# Patient Record
Sex: FEMALE | Race: WHITE | Employment: PART TIME | URBAN - METROPOLITAN AREA
[De-identification: names, ages, dates, MRNs, and addresses within clinical notes are randomized per-mention and may not be internally consistent; named-entity substitution may affect disease eponyms.]

---

## 2021-11-16 ENCOUNTER — HOSPITAL ENCOUNTER (OUTPATIENT)
Dept: PREADMISSION TESTING | Age: 76
Discharge: HOME | End: 2021-11-16
Payer: MEDICARE

## 2021-11-16 ENCOUNTER — HOSPITAL ENCOUNTER (OUTPATIENT)
Dept: PREADMISSION TESTING | Age: 76
Discharge: HOME | End: 2021-11-16

## 2021-11-16 ENCOUNTER — TRANSCRIBE ORDER (OUTPATIENT)
Dept: REGISTRATION | Age: 76
End: 2021-11-16

## 2021-11-16 VITALS — WEIGHT: 150 LBS | HEIGHT: 59 IN | BODY MASS INDEX: 30.24 KG/M2

## 2021-11-16 DIAGNOSIS — M19.011 ARTHRITIS OF RIGHT SHOULDER REGION: Primary | ICD-10-CM

## 2021-11-16 LAB
ALBUMIN SERPL-MCNC: 3.6 G/DL (ref 3.4–5)
ALBUMIN/GLOB SERPL: 1 {RATIO} (ref 0.8–1.7)
ALP SERPL-CCNC: 157 U/L (ref 45–117)
ALT SERPL-CCNC: 42 U/L (ref 13–56)
ANION GAP SERPL CALC-SCNC: 5 MMOL/L (ref 3–18)
AST SERPL-CCNC: 38 U/L (ref 10–38)
BASOPHILS # BLD: 0.1 K/UL (ref 0–0.1)
BASOPHILS NFR BLD: 1 % (ref 0–2)
BILIRUB SERPL-MCNC: 0.5 MG/DL (ref 0.2–1)
BUN SERPL-MCNC: 24 MG/DL (ref 7–18)
BUN/CREAT SERPL: 19 (ref 12–20)
CALCIUM SERPL-MCNC: 9.5 MG/DL (ref 8.5–10.1)
CHLORIDE SERPL-SCNC: 107 MMOL/L (ref 100–111)
CO2 SERPL-SCNC: 29 MMOL/L (ref 21–32)
CREAT SERPL-MCNC: 1.26 MG/DL (ref 0.6–1.3)
DIFFERENTIAL METHOD BLD: ABNORMAL
EOSINOPHIL # BLD: 0.1 K/UL (ref 0–0.4)
EOSINOPHIL NFR BLD: 2 % (ref 0–5)
ERYTHROCYTE [DISTWIDTH] IN BLOOD BY AUTOMATED COUNT: 13.6 % (ref 11.6–14.5)
GLOBULIN SER CALC-MCNC: 3.6 G/DL (ref 2–4)
GLUCOSE SERPL-MCNC: 97 MG/DL (ref 74–99)
HCT VFR BLD AUTO: 37.5 % (ref 35–45)
HGB BLD-MCNC: 11.6 G/DL (ref 12–16)
IMM GRANULOCYTES # BLD AUTO: 0.1 K/UL (ref 0–0.04)
IMM GRANULOCYTES NFR BLD AUTO: 1 % (ref 0–0.5)
LYMPHOCYTES # BLD: 1.7 K/UL (ref 0.9–3.6)
LYMPHOCYTES NFR BLD: 23 % (ref 21–52)
MCH RBC QN AUTO: 28 PG (ref 24–34)
MCHC RBC AUTO-ENTMCNC: 30.9 G/DL (ref 31–37)
MCV RBC AUTO: 90.6 FL (ref 78–100)
MONOCYTES # BLD: 0.6 K/UL (ref 0.05–1.2)
MONOCYTES NFR BLD: 8 % (ref 3–10)
NEUTS SEG # BLD: 4.9 K/UL (ref 1.8–8)
NEUTS SEG NFR BLD: 66 % (ref 40–73)
NRBC # BLD: 0 K/UL (ref 0–0.01)
NRBC BLD-RTO: 0 PER 100 WBC
PLATELET # BLD AUTO: 333 K/UL (ref 135–420)
PMV BLD AUTO: 9 FL (ref 9.2–11.8)
POTASSIUM SERPL-SCNC: 4.4 MMOL/L (ref 3.5–5.5)
PROT SERPL-MCNC: 7.2 G/DL (ref 6.4–8.2)
RBC # BLD AUTO: 4.14 M/UL (ref 4.2–5.3)
SODIUM SERPL-SCNC: 141 MMOL/L (ref 136–145)
WBC # BLD AUTO: 7.3 K/UL (ref 4.6–13.2)

## 2021-11-16 PROCEDURE — 80053 COMPREHEN METABOLIC PANEL: CPT

## 2021-11-16 PROCEDURE — 87086 URINE CULTURE/COLONY COUNT: CPT

## 2021-11-16 PROCEDURE — 85025 COMPLETE CBC W/AUTO DIFF WBC: CPT

## 2021-11-16 PROCEDURE — 36415 COLL VENOUS BLD VENIPUNCTURE: CPT

## 2021-11-16 RX ORDER — TRANEXAMIC ACID 650 1/1
1950 TABLET ORAL 3 TIMES DAILY
Status: CANCELLED | OUTPATIENT
Start: 2021-11-16

## 2021-11-16 RX ORDER — SODIUM CHLORIDE, SODIUM LACTATE, POTASSIUM CHLORIDE, CALCIUM CHLORIDE 600; 310; 30; 20 MG/100ML; MG/100ML; MG/100ML; MG/100ML
125 INJECTION, SOLUTION INTRAVENOUS CONTINUOUS
Status: CANCELLED | OUTPATIENT
Start: 2021-11-16

## 2021-11-16 RX ORDER — MELOXICAM 7.5 MG/1
TABLET ORAL DAILY
COMMUNITY
End: 2022-04-06

## 2021-11-16 RX ORDER — BISMUTH SUBSALICYLATE 262 MG
1 TABLET,CHEWABLE ORAL DAILY
COMMUNITY

## 2021-11-16 RX ORDER — CEFAZOLIN SODIUM/WATER 2 G/20 ML
2 SYRINGE (ML) INTRAVENOUS ONCE
Status: CANCELLED | OUTPATIENT
Start: 2021-11-16 | End: 2021-11-16

## 2021-11-16 RX ORDER — ACETAMINOPHEN 500 MG
500 TABLET ORAL
COMMUNITY
End: 2022-04-06

## 2021-11-16 RX ORDER — LANOLIN ALCOHOL/MO/W.PET/CERES
1 CREAM (GRAM) TOPICAL
COMMUNITY

## 2021-11-16 RX ORDER — LISINOPRIL 5 MG/1
TABLET ORAL DAILY
COMMUNITY

## 2021-11-16 NOTE — PERIOP NOTES
PAT phone interview completed. Patient made aware to be NPO and to quarantine. Patient stated she will come 11/19 at 9-11 to get COVID test. Patient stated she completed EKG 11/5 and will fax EKG on 11/17. Patient made aware not to wear jewelry, makeup, nail polish, lotion, oil or spray on DOS. Patient stated she has a ride for discharge and someone to stay with her for 24 hours after procedure. Patient stated she does not have a DNR.

## 2021-11-17 LAB
BACTERIA SPEC CULT: NORMAL
SERVICE CMNT-IMP: NORMAL

## 2021-11-19 ENCOUNTER — HOSPITAL ENCOUNTER (OUTPATIENT)
Dept: PREADMISSION TESTING | Age: 76
Discharge: HOME | End: 2021-11-19
Payer: MEDICARE

## 2021-11-19 LAB
ATRIAL RATE: 74 BPM
CALCULATED P AXIS, ECG09: 78 DEGREES
CALCULATED R AXIS, ECG10: 84 DEGREES
CALCULATED T AXIS, ECG11: 64 DEGREES
DIAGNOSIS, 93000: NORMAL
P-R INTERVAL, ECG05: 144 MS
Q-T INTERVAL, ECG07: 416 MS
QRS DURATION, ECG06: 124 MS
QTC CALCULATION (BEZET), ECG08: 461 MS
VENTRICULAR RATE, ECG03: 74 BPM

## 2021-11-19 PROCEDURE — 93005 ELECTROCARDIOGRAM TRACING: CPT

## 2021-11-21 LAB
BACTERIA SPEC CULT: NORMAL
BACTERIA SPEC CULT: NORMAL
SERVICE CMNT-IMP: NORMAL

## 2021-11-21 NOTE — H&P
Patient Name:   Sienna Boyd  Account #:  [de-identified]  YOB: 1945    Chief Complaint:  Right shoulder pain. History of Chief Complaint:  She is now 64. She complains of pain in her right shoulder. She has been seeing Dr. Mirian Peterson, who had her get an intra-articular cortisone injection which led to only mild improvement. She also recently had a subacromial space injection through [employer], [facility],  which led to minimal improvement. Past Medical/Surgical History:    Disease/Disorder Date Side Surgery Date Side Comment   Arthritis         Cancer, skin         COVID-19            Arthroscopy knee 12/10/2012 left       Arthroscopy knee 06/11/2012 right       Cataract extraction 2013 bilateral    Colon cancer   Colectomy, partial 02/2017  [facility] 10/15/2018 - chemotherapy      Eyelid surgery 1998        Knee replacement 04/29/2013 right       Nose surgery 2010        Spinal fusion, cervical 02/19/2008  [facility] 10/15/2018 - C5-6, 6-7      Spinal fusion, lumbar 06/17/2008  [facility] 10/15/2018 - L3-S1   Allergies:    Ingredient Reaction Medication Name Comment   SULFUR      CODEINE        Current Medications:    Medication Directions   CALCIUM    Dilaudid 2 mg tablet take 1 tablet by oral route every 8 hours as needed for nausea   Imodium A-D    meloxicam 7.5 mg tablet take 1 tablet by oral route  every day   multivitamin tablet    turmeric 400 mg capsule    Tylenol Arthritis Pain 650 mg tablet,extended release take 2 tablet by oral route  every 8 hours as needed swallowing whole with water. Do not break, crush, dissolve and/or chew. Vitamin B-12    Zofran 4 mg tablet take 1 tablet by oral route every 8 hours as needed for nausea   Social History:    SMOKING  Status Tobacco Type Units Per Day Yrs Used   Former smoker      ALCOHOL  There is a history of alcohol use. Type: Vodka. 2 drinks consumed weekly.     Family History:    Disease Detail Family Member Age Cause of Death Comments   Cancer, lung Mother N    Arthritis Mother  N    Review of Systems:    GENERAL:  Patient has no signs of chills. , fever or weight change  HEAD/ENTM:  Patient has no signs of headaches, dizziness, hearing loss, ringing in ears, sore throat/hoarseness, recent cold, double vision, blurred vision, itchy eyes, eye redness or eye discharge. CARDIOVASCULAR:  Patient has no signs of chest pain, palpitations, rheumatic fever or heart murmur. RESPIRATORY:  Patient has no signs of chronic cough, wheezing, difficulty breathing, pain on breathing or shortness of breath. GASTROINTESTINAL:  Patient has no signs of nausea/vomiting, difficulty swallowing, gas/bloating, indigestion, abdominal pain, diarrhea, bloody stools or hemorrhoids. GENITOURINARY:  Patient has no signs of blood in urine, painful urinating, burning sensation, bladder/kidney infection, frequent urinating or incontinence. MUSCULOSKELETAL: Patient presents with joint stiffness and joint pain. Patient has no signs of fracture/dislocation, sprain/strain, tendonitis, rheumatoid disease, gout or swelling of feet. INTEGUMENTARY:  Patient has no signs of rash/itching, psoriasis, Raynaud's phenomenon or varicose veins. EMOTIONAL:  Patient has no signs of anxiety, depression, bipolar disorder, memory loss or change in mood. Vitals:  Date BP Pulse Temp (F) Resp. (per min.) Height (Total in.) Weight (lbs.) BMI   10/27/2021     59.00 150.00 30.30   07/13/2021     59.00  30.30   04/13/2021     59.00  29.29   01/21/2020     59.00  29.29   10/16/2018     59.00  29.29   07/21/2016     59.00  28.28   06/28/2016     59.00  28.28   08/25/2015 117/74 95   59.00  26.66   02/18/2015 146/90 79   60.00  27.34   12/01/2014 154/91 86   60.00  27.34   09/23/2014 118/82 82   60.00  25.58   01/06/2014 137/97 85   60.00  26.56   Physical Examination:  She has full motion right shoulder, reaching 110 degrees of forward flexion and 80 degrees of abduction.  She has slight weakness in external rotation, but she has good strength in abduction. Radiograph Examination:  Recent AP, scapular, and axillary views of the right shoulder were obtained and reviewed. They show severe glenohumeral DJD with a bone-on-bone appearance. Impression:  Severe glenohumeral degenerative joint disease, right shoulder, with questionable rotator cuff. Plan:  She will be scheduled for a right shoulder reverse total shoulder arthroplasty. She understands the risks and benefits of the procedure, and she is ready to proceed. She was issued a sling. We will plan to use Dilaudid and Zofran postop. She will get medical clearance from her cardiologist preoperatively.

## 2021-11-23 ENCOUNTER — ANESTHESIA EVENT (OUTPATIENT)
Dept: SURGERY | Age: 76
End: 2021-11-23
Payer: MEDICARE

## 2021-11-23 ENCOUNTER — ANESTHESIA (OUTPATIENT)
Dept: SURGERY | Age: 76
End: 2021-11-23
Payer: MEDICARE

## 2021-11-23 ENCOUNTER — HOSPITAL ENCOUNTER (OUTPATIENT)
Age: 76
Discharge: HOME | End: 2021-11-23
Attending: ORTHOPAEDIC SURGERY | Admitting: ORTHOPAEDIC SURGERY
Payer: MEDICARE

## 2021-11-23 VITALS
RESPIRATION RATE: 16 BRPM | SYSTOLIC BLOOD PRESSURE: 134 MMHG | TEMPERATURE: 98.5 F | HEART RATE: 97 BPM | WEIGHT: 151.8 LBS | OXYGEN SATURATION: 93 % | DIASTOLIC BLOOD PRESSURE: 81 MMHG | BODY MASS INDEX: 30.6 KG/M2 | HEIGHT: 59 IN

## 2021-11-23 PROBLEM — M19.011 DJD OF RIGHT SHOULDER: Chronic | Status: ACTIVE | Noted: 2021-11-23

## 2021-11-23 PROBLEM — Z98.890 POST-OPERATIVE STATE: Status: ACTIVE | Noted: 2021-11-23

## 2021-11-23 LAB
ABO + RH BLD: NORMAL
BLOOD GROUP ANTIBODIES SERPL: NORMAL
SPECIMEN EXP DATE BLD: NORMAL

## 2021-11-23 PROCEDURE — 74011000250 HC RX REV CODE- 250: Performed by: NURSE ANESTHETIST, CERTIFIED REGISTERED

## 2021-11-23 PROCEDURE — 97161 PT EVAL LOW COMPLEX 20 MIN: CPT

## 2021-11-23 PROCEDURE — 77030018846 HC SOL IRR STRL H20 ICUM -A: Performed by: ORTHOPAEDIC SURGERY

## 2021-11-23 PROCEDURE — 2709999900 HC NON-CHARGEABLE SUPPLY: Performed by: ORTHOPAEDIC SURGERY

## 2021-11-23 PROCEDURE — 74011250636 HC RX REV CODE- 250/636: Performed by: NURSE ANESTHETIST, CERTIFIED REGISTERED

## 2021-11-23 PROCEDURE — 77030013708 HC HNDPC SUC IRR PULS STRY –B: Performed by: ORTHOPAEDIC SURGERY

## 2021-11-23 PROCEDURE — 74011250636 HC RX REV CODE- 250/636: Performed by: ORTHOPAEDIC SURGERY

## 2021-11-23 PROCEDURE — 76010000153 HC OR TIME 1.5 TO 2 HR: Performed by: ORTHOPAEDIC SURGERY

## 2021-11-23 PROCEDURE — 76210000006 HC OR PH I REC 0.5 TO 1 HR: Performed by: ORTHOPAEDIC SURGERY

## 2021-11-23 PROCEDURE — 36415 COLL VENOUS BLD VENIPUNCTURE: CPT

## 2021-11-23 PROCEDURE — 77030008683 HC TU ET CUF COVD -A: Performed by: ANESTHESIOLOGY

## 2021-11-23 PROCEDURE — 74011000250 HC RX REV CODE- 250: Performed by: ANESTHESIOLOGY

## 2021-11-23 PROCEDURE — 74011250637 HC RX REV CODE- 250/637: Performed by: ORTHOPAEDIC SURGERY

## 2021-11-23 PROCEDURE — 74011000250 HC RX REV CODE- 250: Performed by: ORTHOPAEDIC SURGERY

## 2021-11-23 PROCEDURE — 86901 BLOOD TYPING SEROLOGIC RH(D): CPT

## 2021-11-23 PROCEDURE — 76942 ECHO GUIDE FOR BIOPSY: CPT | Performed by: ORTHOPAEDIC SURGERY

## 2021-11-23 PROCEDURE — 77030033138 HC SUT PGA STRATFX J&J -B: Performed by: ORTHOPAEDIC SURGERY

## 2021-11-23 PROCEDURE — C1776 JOINT DEVICE (IMPLANTABLE): HCPCS | Performed by: ORTHOPAEDIC SURGERY

## 2021-11-23 PROCEDURE — 97116 GAIT TRAINING THERAPY: CPT

## 2021-11-23 PROCEDURE — 74011250636 HC RX REV CODE- 250/636: Performed by: ANESTHESIOLOGY

## 2021-11-23 PROCEDURE — 77030027671 HC WRE K EXAC -B: Performed by: ORTHOPAEDIC SURGERY

## 2021-11-23 PROCEDURE — 77030031139 HC SUT VCRL2 J&J -A: Performed by: ORTHOPAEDIC SURGERY

## 2021-11-23 PROCEDURE — 97535 SELF CARE MNGMENT TRAINING: CPT

## 2021-11-23 PROCEDURE — 77030027138 HC INCENT SPIROMETER -A: Performed by: ORTHOPAEDIC SURGERY

## 2021-11-23 PROCEDURE — 77030020782 HC GWN BAIR PAWS FLX 3M -B: Performed by: ORTHOPAEDIC SURGERY

## 2021-11-23 PROCEDURE — 77030026044 HC TIP IRR PULS STRY -A: Performed by: ORTHOPAEDIC SURGERY

## 2021-11-23 PROCEDURE — 77030002922 HC SUT FBRWRE ARTH -B: Performed by: ORTHOPAEDIC SURGERY

## 2021-11-23 PROCEDURE — 74011250637 HC RX REV CODE- 250/637: Performed by: ANESTHESIOLOGY

## 2021-11-23 PROCEDURE — 77030040361 HC SLV COMPR DVT MDII -B: Performed by: ORTHOPAEDIC SURGERY

## 2021-11-23 PROCEDURE — 76060000034 HC ANESTHESIA 1.5 TO 2 HR: Performed by: ORTHOPAEDIC SURGERY

## 2021-11-23 PROCEDURE — 77030008477 HC STYL SATN SLP COVD -A: Performed by: ANESTHESIOLOGY

## 2021-11-23 PROCEDURE — 77030006807 HC BLD SAW RECIP KMET -B: Performed by: ORTHOPAEDIC SURGERY

## 2021-11-23 PROCEDURE — 64450 NJX AA&/STRD OTHER PN/BRANCH: CPT

## 2021-11-23 PROCEDURE — 77030006643: Performed by: ANESTHESIOLOGY

## 2021-11-23 PROCEDURE — 77030003806 HC BIT DRL EXAC -E: Performed by: ORTHOPAEDIC SURGERY

## 2021-11-23 PROCEDURE — 97166 OT EVAL MOD COMPLEX 45 MIN: CPT

## 2021-11-23 DEVICE — KIT BONE SCR L18MM DIA4.5MM GLEN SHLDR WHT COMPR LCK CAP RVS: Type: IMPLANTABLE DEVICE | Site: SHOULDER | Status: FUNCTIONAL

## 2021-11-23 DEVICE — SCR BNE LCK GLENOSPHERE -- EQUINOXE: Type: IMPLANTABLE DEVICE | Site: SHOULDER | Status: FUNCTIONAL

## 2021-11-23 DEVICE — SCR LCK CAP KIT 4.5X30MM BLU -- EQUINOXE: Type: IMPLANTABLE DEVICE | Site: SHOULDER | Status: FUNCTIONAL

## 2021-11-23 DEVICE — SCR TORQUE DEFINING KIT -- EQUINOXE: Type: IMPLANTABLE DEVICE | Site: SHOULDER | Status: FUNCTIONAL

## 2021-11-23 DEVICE — SHOULDER S3 TOT ADV REVRS -- IMPL CAPPED S3: Type: IMPLANTABLE DEVICE | Site: SHOULDER | Status: FUNCTIONAL

## 2021-11-23 DEVICE — KIT BNE SCR L22MM DIA4.5MM GLEN SHLDR BLK COMPR LOK CAP REV: Type: IMPLANTABLE DEVICE | Site: SHOULDER | Status: FUNCTIONAL

## 2021-11-23 DEVICE — TRAY HUM ADPT REV +0 -- EQUINOXE: Type: IMPLANTABLE DEVICE | Site: SHOULDER | Status: FUNCTIONAL

## 2021-11-23 DEVICE — IMPLANTABLE DEVICE: Type: IMPLANTABLE DEVICE | Site: SHOULDER | Status: FUNCTIONAL

## 2021-11-23 DEVICE — STEM HUM DIA10MM SHLDR PRI PRESSFIT EQUINOXE: Type: IMPLANTABLE DEVICE | Site: SHOULDER | Status: FUNCTIONAL

## 2021-11-23 DEVICE — IMPLANTABLE DEVICE
Type: IMPLANTABLE DEVICE | Site: SHOULDER | Status: FUNCTIONAL
Brand: EQUINOXE

## 2021-11-23 RX ORDER — SODIUM CHLORIDE 0.9 % (FLUSH) 0.9 %
5-40 SYRINGE (ML) INJECTION EVERY 8 HOURS
Status: DISCONTINUED | OUTPATIENT
Start: 2021-11-23 | End: 2021-11-23 | Stop reason: HOSPADM

## 2021-11-23 RX ORDER — ONDANSETRON 2 MG/ML
4 INJECTION INTRAMUSCULAR; INTRAVENOUS
Status: DISCONTINUED | OUTPATIENT
Start: 2021-11-23 | End: 2021-11-23 | Stop reason: HOSPADM

## 2021-11-23 RX ORDER — ROCURONIUM BROMIDE 10 MG/ML
INJECTION, SOLUTION INTRAVENOUS AS NEEDED
Status: DISCONTINUED | OUTPATIENT
Start: 2021-11-23 | End: 2021-11-23 | Stop reason: HOSPADM

## 2021-11-23 RX ORDER — CEFAZOLIN SODIUM/WATER 2 G/20 ML
2 SYRINGE (ML) INTRAVENOUS EVERY 8 HOURS
Status: DISCONTINUED | OUTPATIENT
Start: 2021-11-23 | End: 2021-11-23 | Stop reason: HOSPADM

## 2021-11-23 RX ORDER — DIPHENHYDRAMINE HYDROCHLORIDE 50 MG/ML
12.5 INJECTION, SOLUTION INTRAMUSCULAR; INTRAVENOUS
Status: DISCONTINUED | OUTPATIENT
Start: 2021-11-23 | End: 2021-11-23 | Stop reason: HOSPADM

## 2021-11-23 RX ORDER — ONDANSETRON 2 MG/ML
INJECTION INTRAMUSCULAR; INTRAVENOUS AS NEEDED
Status: DISCONTINUED | OUTPATIENT
Start: 2021-11-23 | End: 2021-11-23 | Stop reason: HOSPADM

## 2021-11-23 RX ORDER — SODIUM CHLORIDE, SODIUM LACTATE, POTASSIUM CHLORIDE, CALCIUM CHLORIDE 600; 310; 30; 20 MG/100ML; MG/100ML; MG/100ML; MG/100ML
125 INJECTION, SOLUTION INTRAVENOUS CONTINUOUS
Status: DISCONTINUED | OUTPATIENT
Start: 2021-11-23 | End: 2021-11-23 | Stop reason: HOSPADM

## 2021-11-23 RX ORDER — SODIUM CHLORIDE 0.9 % (FLUSH) 0.9 %
5-40 SYRINGE (ML) INJECTION AS NEEDED
Status: DISCONTINUED | OUTPATIENT
Start: 2021-11-23 | End: 2021-11-23 | Stop reason: HOSPADM

## 2021-11-23 RX ORDER — PHENYLEPHRINE HCL IN 0.9% NACL 1 MG/10 ML
SYRINGE (ML) INTRAVENOUS AS NEEDED
Status: DISCONTINUED | OUTPATIENT
Start: 2021-11-23 | End: 2021-11-23 | Stop reason: HOSPADM

## 2021-11-23 RX ORDER — TRANEXAMIC ACID 650 1/1
1950 TABLET ORAL 3 TIMES DAILY
Status: DISCONTINUED | OUTPATIENT
Start: 2021-11-23 | End: 2021-11-23

## 2021-11-23 RX ORDER — PROPOFOL 10 MG/ML
INJECTION, EMULSION INTRAVENOUS AS NEEDED
Status: DISCONTINUED | OUTPATIENT
Start: 2021-11-23 | End: 2021-11-23 | Stop reason: HOSPADM

## 2021-11-23 RX ORDER — NEOSTIGMINE METHYLSULFATE 1 MG/ML
INJECTION, SOLUTION INTRAVENOUS AS NEEDED
Status: DISCONTINUED | OUTPATIENT
Start: 2021-11-23 | End: 2021-11-23 | Stop reason: HOSPADM

## 2021-11-23 RX ORDER — HYDROMORPHONE HYDROCHLORIDE 2 MG/ML
0.5 INJECTION, SOLUTION INTRAMUSCULAR; INTRAVENOUS; SUBCUTANEOUS
Status: DISCONTINUED | OUTPATIENT
Start: 2021-11-23 | End: 2021-11-23 | Stop reason: HOSPADM

## 2021-11-23 RX ORDER — DEXMEDETOMIDINE HYDROCHLORIDE 100 UG/ML
INJECTION, SOLUTION INTRAVENOUS
Status: COMPLETED | OUTPATIENT
Start: 2021-11-23 | End: 2021-11-23

## 2021-11-23 RX ORDER — ALBUTEROL SULFATE 0.83 MG/ML
2.5 SOLUTION RESPIRATORY (INHALATION)
Status: DISCONTINUED | OUTPATIENT
Start: 2021-11-23 | End: 2021-11-23 | Stop reason: HOSPADM

## 2021-11-23 RX ORDER — ACETAMINOPHEN 500 MG
1000 TABLET ORAL ONCE
Status: COMPLETED | OUTPATIENT
Start: 2021-11-23 | End: 2021-11-23

## 2021-11-23 RX ORDER — EPHEDRINE SULFATE/0.9% NACL/PF 50 MG/5 ML
SYRINGE (ML) INTRAVENOUS AS NEEDED
Status: DISCONTINUED | OUTPATIENT
Start: 2021-11-23 | End: 2021-11-23 | Stop reason: HOSPADM

## 2021-11-23 RX ORDER — CEFAZOLIN SODIUM/WATER 2 G/20 ML
2 SYRINGE (ML) INTRAVENOUS ONCE
Status: COMPLETED | OUTPATIENT
Start: 2021-11-23 | End: 2021-11-23

## 2021-11-23 RX ORDER — CELECOXIB 100 MG/1
200 CAPSULE ORAL ONCE
Status: COMPLETED | OUTPATIENT
Start: 2021-11-23 | End: 2021-11-23

## 2021-11-23 RX ORDER — FENTANYL CITRATE 50 UG/ML
25 INJECTION, SOLUTION INTRAMUSCULAR; INTRAVENOUS AS NEEDED
Status: DISCONTINUED | OUTPATIENT
Start: 2021-11-23 | End: 2021-11-23 | Stop reason: HOSPADM

## 2021-11-23 RX ORDER — SODIUM CHLORIDE, SODIUM LACTATE, POTASSIUM CHLORIDE, CALCIUM CHLORIDE 600; 310; 30; 20 MG/100ML; MG/100ML; MG/100ML; MG/100ML
100 INJECTION, SOLUTION INTRAVENOUS CONTINUOUS
Status: DISCONTINUED | OUTPATIENT
Start: 2021-11-23 | End: 2021-11-23 | Stop reason: HOSPADM

## 2021-11-23 RX ORDER — TRANEXAMIC ACID 650 1/1
1950 TABLET ORAL ONCE
Status: COMPLETED | OUTPATIENT
Start: 2021-11-23 | End: 2021-11-23

## 2021-11-23 RX ORDER — ROPIVACAINE HYDROCHLORIDE 5 MG/ML
INJECTION, SOLUTION EPIDURAL; INFILTRATION; PERINEURAL
Status: COMPLETED | OUTPATIENT
Start: 2021-11-23 | End: 2021-11-23

## 2021-11-23 RX ORDER — NALOXONE HYDROCHLORIDE 0.4 MG/ML
0.1 INJECTION, SOLUTION INTRAMUSCULAR; INTRAVENOUS; SUBCUTANEOUS AS NEEDED
Status: DISCONTINUED | OUTPATIENT
Start: 2021-11-23 | End: 2021-11-23 | Stop reason: HOSPADM

## 2021-11-23 RX ORDER — FENTANYL CITRATE 50 UG/ML
INJECTION, SOLUTION INTRAMUSCULAR; INTRAVENOUS AS NEEDED
Status: DISCONTINUED | OUTPATIENT
Start: 2021-11-23 | End: 2021-11-23 | Stop reason: HOSPADM

## 2021-11-23 RX ORDER — LIDOCAINE HYDROCHLORIDE 20 MG/ML
INJECTION, SOLUTION EPIDURAL; INFILTRATION; INTRACAUDAL; PERINEURAL AS NEEDED
Status: DISCONTINUED | OUTPATIENT
Start: 2021-11-23 | End: 2021-11-23 | Stop reason: HOSPADM

## 2021-11-23 RX ORDER — HYDROMORPHONE HCL IN WATER/PF 1 MG/ML
SYRINGE (ML) INJECTION
Status: DISCONTINUED | OUTPATIENT
Start: 2021-11-23 | End: 2021-11-23 | Stop reason: HOSPADM

## 2021-11-23 RX ORDER — MIDAZOLAM HYDROCHLORIDE 1 MG/ML
INJECTION, SOLUTION INTRAMUSCULAR; INTRAVENOUS AS NEEDED
Status: DISCONTINUED | OUTPATIENT
Start: 2021-11-23 | End: 2021-11-23 | Stop reason: HOSPADM

## 2021-11-23 RX ORDER — GLYCOPYRROLATE 0.2 MG/ML
INJECTION INTRAMUSCULAR; INTRAVENOUS AS NEEDED
Status: DISCONTINUED | OUTPATIENT
Start: 2021-11-23 | End: 2021-11-23 | Stop reason: HOSPADM

## 2021-11-23 RX ORDER — HYDROMORPHONE HYDROCHLORIDE 2 MG/1
2 TABLET ORAL
Status: DISCONTINUED | OUTPATIENT
Start: 2021-11-23 | End: 2021-11-23 | Stop reason: HOSPADM

## 2021-11-23 RX ORDER — DEXAMETHASONE SODIUM PHOSPHATE 10 MG/ML
INJECTION INTRAMUSCULAR; INTRAVENOUS
Status: COMPLETED | OUTPATIENT
Start: 2021-11-23 | End: 2021-11-23

## 2021-11-23 RX ORDER — DEXAMETHASONE SODIUM PHOSPHATE 4 MG/ML
INJECTION, SOLUTION INTRA-ARTICULAR; INTRALESIONAL; INTRAMUSCULAR; INTRAVENOUS; SOFT TISSUE AS NEEDED
Status: DISCONTINUED | OUTPATIENT
Start: 2021-11-23 | End: 2021-11-23 | Stop reason: HOSPADM

## 2021-11-23 RX ADMIN — FENTANYL CITRATE 50 MCG: 50 INJECTION, SOLUTION INTRAMUSCULAR; INTRAVENOUS at 07:33

## 2021-11-23 RX ADMIN — TRANEXAMIC ACID 1950 MG: 650 TABLET ORAL at 06:40

## 2021-11-23 RX ADMIN — DEXAMETHASONE SODIUM PHOSPHATE 4 MG: 4 INJECTION, SOLUTION INTRAMUSCULAR; INTRAVENOUS at 09:03

## 2021-11-23 RX ADMIN — DEXMEDETOMIDINE HYDROCHLORIDE 15 MCG: 100 INJECTION, SOLUTION INTRAVENOUS at 07:16

## 2021-11-23 RX ADMIN — CEFAZOLIN 2 G: 1 INJECTION, POWDER, FOR SOLUTION INTRAVENOUS at 07:39

## 2021-11-23 RX ADMIN — FENTANYL CITRATE 50 MCG: 50 INJECTION, SOLUTION INTRAMUSCULAR; INTRAVENOUS at 07:12

## 2021-11-23 RX ADMIN — Medication 50 MCG: at 07:57

## 2021-11-23 RX ADMIN — SODIUM CHLORIDE, SODIUM LACTATE, POTASSIUM CHLORIDE, AND CALCIUM CHLORIDE: 600; 310; 30; 20 INJECTION, SOLUTION INTRAVENOUS at 09:13

## 2021-11-23 RX ADMIN — ROPIVACAINE HYDROCHLORIDE 20 ML: 5 INJECTION, SOLUTION EPIDURAL; INFILTRATION; PERINEURAL at 07:16

## 2021-11-23 RX ADMIN — CELECOXIB 200 MG: 100 CAPSULE ORAL at 06:40

## 2021-11-23 RX ADMIN — Medication 100 MCG: at 08:36

## 2021-11-23 RX ADMIN — Medication 100 MCG: at 09:06

## 2021-11-23 RX ADMIN — MIDAZOLAM 1 MG: 1 INJECTION INTRAMUSCULAR; INTRAVENOUS at 07:12

## 2021-11-23 RX ADMIN — CEFAZOLIN SODIUM 2 G: 10 INJECTION, POWDER, FOR SOLUTION INTRAVENOUS at 15:11

## 2021-11-23 RX ADMIN — SODIUM CHLORIDE, SODIUM LACTATE, POTASSIUM CHLORIDE, AND CALCIUM CHLORIDE 125 ML/HR: 600; 310; 30; 20 INJECTION, SOLUTION INTRAVENOUS at 15:05

## 2021-11-23 RX ADMIN — ONDANSETRON HYDROCHLORIDE 4 MG: 2 INJECTION INTRAMUSCULAR; INTRAVENOUS at 09:04

## 2021-11-23 RX ADMIN — Medication 100 MCG: at 08:54

## 2021-11-23 RX ADMIN — SODIUM CHLORIDE, SODIUM LACTATE, POTASSIUM CHLORIDE, AND CALCIUM CHLORIDE 125 ML/HR: 600; 310; 30; 20 INJECTION, SOLUTION INTRAVENOUS at 06:35

## 2021-11-23 RX ADMIN — Medication 100 MCG: at 08:01

## 2021-11-23 RX ADMIN — Medication 100 MCG: at 07:49

## 2021-11-23 RX ADMIN — Medication 3 MG: at 09:22

## 2021-11-23 RX ADMIN — ROCURONIUM BROMIDE 20 MG: 10 INJECTION, SOLUTION INTRAVENOUS at 08:44

## 2021-11-23 RX ADMIN — ROCURONIUM BROMIDE 30 MG: 10 INJECTION, SOLUTION INTRAVENOUS at 07:33

## 2021-11-23 RX ADMIN — DEXAMETHASONE SODIUM PHOSPHATE 4 MG: 10 INJECTION, SOLUTION INTRAMUSCULAR; INTRAVENOUS at 07:16

## 2021-11-23 RX ADMIN — ACETAMINOPHEN 1000 MG: 500 TABLET ORAL at 06:40

## 2021-11-23 RX ADMIN — GLYCOPYRROLATE 0.4 MG: 0.2 INJECTION INTRAMUSCULAR; INTRAVENOUS at 09:22

## 2021-11-23 RX ADMIN — LIDOCAINE HYDROCHLORIDE 60 MG: 20 INJECTION, SOLUTION INTRAVENOUS at 07:33

## 2021-11-23 RX ADMIN — Medication 10 MG: at 08:06

## 2021-11-23 RX ADMIN — PROPOFOL 100 MG: 10 INJECTION, EMULSION INTRAVENOUS at 07:33

## 2021-11-23 NOTE — OP NOTES
PREOPERATIVE DIAGNOSIS: Cuff Tear Arthropathy, right shoulder. POSTOPERATIVE DIAGNOSIS: Cuff Tear Arthropathy, right shoulder. PROCEDURE: right Reverse Total Shoulder Arthroplasty [employer]    IMPLANTS:   Implant Name Type Inv.  Item Serial No.  Lot No. LRB No. Used Action   extended locking caps   [id number]   Right 1 Implanted   KIT BNE SCR L22MM DIA4.5MM KIRSTEN SHLDR BLK COMPR MOSHE CAP REV - [id number]  KIT BNE SCR L22MM DIA4.5MM KIRSTEN SHLDR BLK COMPR MOSHE CAP REV [id number] [employer]WD  Right 1 Implanted   SCR LCK CAP KIT 4.5X30MM TEODORA -- [employer] - [id number]  SCR LCK CAP KIT 4.5X30MM TEODORA -- EQUINOXE [id number] [employer]  Right 1 Implanted   PLATE KIRSTEN STD SHLDR GLENDY REV [employer] - [id number]  PLATE KIRSTEN STD SHLDR GLENDY REV EQUINOXE [id number] [employer]WD  Right 1 Implanted   SCR TORQUE DEFINING KIT -- EQUIN[employer]E - [id number]  SCR TORQUE DEFINING KIT -- Sudhir Bel [id number] [employer]  Right 1 Implanted   LINER HUM [id number] +0MM OFFSET STD POLY FOR REV SHLDR SYS - [id number]  LINER HUM [id number] +0MM OFFSET STD POLY FOR REV SHLDR SYS [id number] [employer]  Right 1 Implanted   KIT BONE SCR L18MM DIA4.5MM KIRSTEN SHLDR WHT COMPR LCK CAP RVS - [id number]  KIT BONE SCR L18MM DIA4.5MM KIRSTEN SHLDR WHT COMPR LCK CAP RVS [id number] [employer]WD  Right 1 Implanted   SCR BNE LCK GLENOSPHERE -- EQUINOXE - [id number]  SCR BNE LCK GLENOSPHERE -- EQUINOXE [id number] [employer]  Right 1 Implanted   TRAY HUM ADPT REV +0 -- E[employer]E - [id number]  TRAY HUM ADPT REV +0 -- Sudhir Bel [id number] [employer]  Right 1 Implanted   STEM HUM [id number] SHLDR KARL PRESSFIT Dulcie Hides [id number]  STEM HUM [id number] SHLDR KARL PRESSFIT EQUINOXE [id number] [employer]_  Right 1 Implanted       SURGEON: Edil Ambrosio MD    ANESTHESIOLOGIST: Anesthesiologist: Alejandrina Ibarra MD  CRNA: Giuseppe Villanueva CRNA     ASSISTANTS: Circ-1: Mariel Naylor RN  Circ-Relief: Mary Up RN  Scrub Tech-1: Howard Gerardo  Scrub Private/Assistant: Zev Lord CNA    ANAESTHESIA: general anesthesia after scalene block. COMPLICATIONS: None. ESTIMATED BLOOD LOSS: 100 cc    SPECIMENS: None     DRAINS:  None. DESCRIPTION OF PROCEDURE: This 64 y. o.year-old female  with a history of persistent right shoulder pain and weakness, had a history of an unrepairable rotator cuff tear and DJD of the glenohumeral joint. The patient then opted to proceed with reconstructive surgery. The patient was taken to the operating room and placed in the beach-chair position, and the right shoulder was prepped and draped in a normal manner. The skin was injected with 0.5% Marcaine with epinephrine. An oblique incision was made over the anterior aspect of the shoulder. The incision was carried through the subcutaneous tissue through the deep fascia and the cephalic vein was retracted laterally. The dissection was carried around the humeral head. The biceps was dissected free, tenodesed distally with #2 FiberWire suture and the intraarticular portion was excised. The lesser tuberosity was osteotomized and the subscapularis was freed circumferentially and allowed to retract medially with stay sutures of #2 FiberWire. The humeral head was dislocated, and it was noted to have severe arthritic changes, along with a massive unrepairable tear of the rotator cuff. The head was osteotomized using a saw guide. The shaft was prepared using sequential reamers. The humeral head was retracted inferiorly and posteriorly. Attention was then turned to the glenoid, which showed severe arthritic changes. The labrum was excised circumferentially. The glenoid reamer was then used and finally the metaglene was placed in position, it was then secured using 3 screws, all of which were locking. The  glenosphere was then screwed in position. The proximal humerus was then prepared and trials were placed in position with sequential reductions. The trials were removed.  The shaft was irrigated using antiobiotic solution via pulsed lavage. The #2 FiberWire sutures were passed through the area of the lesser tuberosity, and the sutures were passed around the humeral component. Trial reductions were again performed. The final humeral  component was placed in position. The shoulder was reduced. Good motion was confirmed with no sign of instability. The wound was thoroughly irrigated with antibiotic solution. Hemostasis was assured using a Bovie. The lesser tuberosity was reattached using the #2 FiberWire sutures. The delto-pectoral interval was closed using 0 Vicryl suture. The subcutaneous tissue was closed with 2-0 vicryl and the skin with a Quill stitch. A sterile compressive dressing was applied, along with a sling. The patient left the operating room in satisfactory condition.

## 2021-11-23 NOTE — PERIOP NOTES
Pt transferred via bed to room 315 with attendant; vital signs stable; report off to [facility].

## 2021-11-23 NOTE — PERIOP NOTES
TRANSFER - OUT REPORT:    Verbal report given to [facility] RN(name) on Lillian Model  being transferred to [facility](unit) for routine post - op       Report consisted of patients Situation, Background, Assessment and   Recommendations(SBAR). Information from the following report(s) SBAR, Kardex, OR Summary, Procedure Summary, Intake/Output, MAR and Cardiac Rhythm NSR was reviewed with the receiving nurse. Lines:   Peripheral IV 61/22/49 Left Cephalic (Active)   Site Assessment Clean, dry, & intact 11/23/21 1010   Phlebitis Assessment 0 11/23/21 1010   Infiltration Assessment 0 11/23/21 1010   Dressing Status Clean, dry, & intact 11/23/21 1010   Dressing Type Transparent; Tape 11/23/21 1010   Hub Color/Line Status Green; Infusing; Patent 11/23/21 1010   Alcohol Cap Used No 11/23/21 6675        Opportunity for questions and clarification was provided.       Patient transported with:   O2 @ 2 liters  Registered Nurse  [facility]

## 2021-11-23 NOTE — DISCHARGE INSTRUCTIONS
Patient Education        Shoulder Replacement Surgery: What to Expect at Home  Your Recovery     Shoulder replacement surgery replaces the worn parts of your shoulder joint. When you leave the hospital, your arm will be in a sling. It will be helpful if there is someone to help you at home for the next few weeks or until you have more energy and can move around better. You will go home with a bandage and stitches, staples, skin glue, or tape strips. You can remove the bandage when your doctor tells you to. If you have staples, your doctor will remove them in 10 to 21 days. If you have stitches that are not the type that dissolve, your doctor will remove them in 10 to 14 days. Glue or tape strips will fall off on their own over time. You may still have some mild pain, and the area may be swollen for several months after surgery. Your doctor will give you medicine for the pain. A physical therapist will show you what exercises to do at home. You will continue the rehabilitation program (rehab) you started in the hospital. The better you do with your rehab exercises, the sooner you will get your strength and movement back. Depending on your job, you may be able to go back to work as early as 2 to 3 weeks after surgery, as long as you avoid certain arm movements, such as lifting. It takes at least 6 months to return to full activity. In the future, make sure to let all health professionals know about your artificial shoulder so they will know how to care for you. This care sheet gives you a general idea about how long it will take for you to recover. But each person recovers at a different pace. Follow the steps below to get better as quickly as possible. How can you care for yourself at home? Activity    · Rest when you feel tired. You may take a nap, but don't stay in bed all day.     · Work with your physical therapist to learn the best way to exercise.     · You will have a sling to wear at night.  And it's a good idea to also put a small stack of folded sheets or towels under your upper arm while you are in bed to keep your arm from dropping too far back.     · Your arm should stay next to your body or in front of it for several weeks, both while you are up and during sleep.     · Don't lift anything with the affected arm for 6 weeks.     · Ask your doctor when you can drive again.     · Ask your doctor when it is okay for you to have sex.     · Your doctor may advise you to give up activities that put stress on that shoulder. This includes sports such as weight lifting or tennis, unless your tennis arm was not the one affected. Diet    · By the time you leave the hospital, you will probably be eating your normal diet. If your stomach is upset, try bland, low-fat foods like plain rice, broiled chicken, toast, and yogurt. Your doctor may recommend that you take iron and vitamin supplements.     · Drink plenty of fluids (unless your doctor tells you not to).     · You may notice that your bowel movements are not regular right after your surgery. This is common. Try to avoid constipation and straining with bowel movements. You may want to take a fiber supplement every day. If you have not had a bowel movement after a couple of days, ask your doctor about taking a mild laxative. Medicines    · Your doctor will tell you if and when you can restart your medicines. You will also get instructions about taking any new medicines.     · If you take aspirin or some other blood thinner, ask your doctor if and when to start taking it again. Make sure that you understand exactly what your doctor wants you to do.     · Be safe with medicines. Take pain medicines exactly as directed. ? If the doctor gave you a prescription medicine for pain, take it as prescribed.   ? If you are not taking a prescription pain medicine, ask your doctor if you can take an over-the-counter medicine.     · If you think your pain medicine is making you sick to your stomach:  ? Take your medicine after meals (unless your doctor has told you not to). ? Ask your doctor for a different pain medicine.     · If your doctor prescribed antibiotics, take them as directed. Don't stop taking them just because you feel better. You need to take the full course of antibiotics.     · If you take a blood thinner, be sure you get instructions about how to take your medicine safely. Blood thinners can cause serious bleeding problems. Incision care    · If your doctor told you how to care for your cut (incision), follow your doctor's instructions. You will have a dressing over the cut. A dressing helps the incision heal and protects it. Your doctor will tell you how to take care of this.     · If you did not get instructions, follow this general advice:  ? If you have strips of tape on the cut the doctor made, leave the tape on for a week or until it falls off.  ? If you have stitches or staples, your doctor will tell you when to come back to have them removed. ? If you have skin glue on the cut, leave it on until it falls off. Skin glue is also called skin adhesive or liquid stitches. ? Change the bandage every day. ? Wash the area daily with warm water, and pat it dry. Don't use hydrogen peroxide or alcohol. They can slow healing. ? You may cover the area with a gauze bandage if it oozes fluid or rubs against clothing. ? You may shower 24 to 48 hours after surgery. Pat the incision dry. Don't swim or take a bath for the first 2 weeks, or until your doctor tells you it is okay. Exercise    · Shoulder rehabilitation is a series of exercises you do after your surgery. This helps you get back your shoulder's range of motion and strength. You will work with your doctor and physical therapist to plan this exercise program. To get the best results, you need to do the exercises correctly and as often and as long as your doctor tells you.    Ice    · For pain, put ice or a cold pack on the area for 10 to 20 minutes at a time. Put a thin cloth between the ice and your skin. Follow-up care is a key part of your treatment and safety. Be sure to make and go to all appointments, and call your doctor if you are having problems. It's also a good idea to know your test results and keep a list of the medicines you take. When should you call for help? Call 911 anytime you think you may need emergency care. For example, call if:    · You have severe trouble breathing.     · You have symptoms of a blood clot in your lung (called a pulmonary embolism). These may include:  ? Sudden chest pain. ? Trouble breathing. ? Coughing up blood. Call your doctor now or seek immediate medical care if:    · You have severe or increasing pain.     · You have symptoms of infection, such as:  ? Increased pain, swelling, warmth, or redness. ? Red streaks or pus. ? A fever.     · You have tingling, weakness, or numbness in your arm.     · Your arm turns cold or changes color.     · You have symptoms of a blood clot in your leg (called a deep vein thrombosis). These may include:  ? Pain in the calf, back of the knee, thigh, or groin. ? Redness and swelling in the leg or groin. Watch closely for changes in your health, and be sure to contact your doctor if:    · You do not get better as expected. Where can you learn more? Go to http://www.gray.com/  Enter V783 in the search box to learn more about \"Shoulder Replacement Surgery: What to Expect at Home. \"  Current as of: July 1, 2021               Content Version: 13.0  © 2402-6153 [employer], [employer]. Care instructions adapted under license by [employer] (which disclaims liability or warranty for this information).  If you have questions about a medical condition or this instruction, always ask your healthcare professional. [address] any warranty or liability for your use of this information.

## 2021-11-23 NOTE — PERIOP NOTES
[facility] RN notified SBAR report is ready for review at this time. Patient assigned to room 315.

## 2021-11-23 NOTE — ANESTHESIA PROCEDURE NOTES
R Interscalene Peripheral Block    Start time: 11/23/2021 7:13 AM  End time: 11/23/2021 7:16 AM  Performed by: Clement Johnston MD  Authorized by: Clement Johnston MD       Pre-procedure: Indications: at surgeon's request and post-op pain management    Preanesthetic Checklist: patient identified, risks and benefits discussed, site marked, timeout performed, anesthesia consent given and patient being monitored    Timeout Time: 07:12 EST          Block Type:   Block Type: Interscalene  Laterality:  Right  Monitoring:  Standard ASA monitoring, responsive to questions, oxygen, continuous pulse ox, heart rate and frequent vital sign checks  Injection Technique:  Single shot  Procedures: ultrasound guided and nerve stimulator    Patient position: semifowlers.   Prep: DuraPrep    Location:  Interscalene  Needle Type:  Stimuplex  Needle Gauge:  20 G  Needle Localization:  Nerve stimulator and ultrasound guidance  Medication Injected:  Ropivacaine (PF) (NAROPIN) 5 mg/mL (0.5 %) injection, 20 mL  dexmedeTOMidine (PRECEDEX) 100 mcg/mL iv solution, 15 mcg  dexamethasone (PF) (DECADRON) 10 mg/mL injection, 4 mg  Med Admin Time: 11/23/2021 7:16 AM    Assessment:  Number of attempts:  1  Injection Assessment:  Incremental injection every 5 mL, local visualized surrounding nerve on ultrasound, negative aspiration for blood, no intravascular symptoms, no paresthesia and ultrasound image on chart  Patient tolerance:  Patient tolerated the procedure well with no immediate complications

## 2021-11-23 NOTE — PROGRESS NOTES
This writer has reviewed discharge instructions with patient at this time. Patient has verbalized understanding. Patient was provided with care notes to include side effects of RX's. Arm bands removed and shredded. AVS reviewed with Adolfo Morgan.

## 2021-11-23 NOTE — PROGRESS NOTES
Transition of Care (MAI) Plan:          Pt admitted for an elective right shoulder surgical procedure. Pt is independent. Please encourage ambulation. No transition of care needs identified at this time. Anticipate pt will be medically stable for discharge within the next 24-48 hours with physician follow up. CM available to assist as needed. MAI Transportation:   How is patient being transported at discharge? Family/Friend      When? Once cleared by physician     Is transport scheduled? N/A      Follow-up appointment and transportation:   PCP/Specialist?  See AVS for Appointment         Who is transporting to the follow-up appointment? Self/Family/Friend      Is transport for follow up appointment scheduled? N/A    Communication plan (with patient/family): Who is being called? Patient or Next of Kin? Responsible party? Patient      What number(s) is to be used? See Facesheet      What service provider is calling for [facility] services? When are they calling? Readmission Risk? (Green/Low; Yellow/Moderate; Red/High):  [facility] to complete [address] including selection of the Healthcare Decision Maker Relationship (ie \"Primary\")  Care Management Interventions  PCP Verified by CM: Yes  Palliative Care Criteria Met (RRAT>21 & CHF Dx)?: No  Mode of Transport at Discharge:  Other (see comment) (friend)  Physical Therapy Consult: Yes  Occupational Therapy Consult: Yes  Support Systems: Other Family Member(s)  Confirm Follow Up Transport: Friends  Discharge Location  Discharge Placement: Home

## 2021-11-23 NOTE — PROGRESS NOTES
Problem: Mobility Impaired (Adult and Pediatric)  Goal: *Acute Goals and Plan of Care (Insert Text)  Description: In 1 day pt will be able to perform:  Supine to sit to sit CGA/S with HR for positioning. Transfers:   Sit to stand to chair CGA/S with LRAD for ADL's. Gait:  Ambulate >100ft CGA/S with LRAD, no LOB, for home/community mobility. Stair Negotiation:  Ascend/descend 6-inch box step min A/CGA with HR/HHA x 1 completions for home entry. Patient/Family Education:   Independent with HEP and demonstrate competency with don/doff sling for follow-up care and safe discharge. Note: [x]  Patient has met MD william victoria for d/c home   [x]  Recommend HH with 24 hour adult care   []  Benefit from additional acute PT session to address:      PHYSICAL THERAPY EVALUATION    Patient: García Johnson (56 y.o. female)  Date: 11/23/2021  Primary Diagnosis: Post-operative state [Z98.890]  Procedure(s) (LRB):  EXACTECH REVERSE TOTAL RIGHT SHOULDER ARTHROPLASTY GEN WITH SCALENE BLOCK PRN (Right) Day of Surgery   Precautions:   Fall (shoulder)    PLOF: Independent; R hand dominant    ASSESSMENT :  Based on the objective data described below, the patient presents with decreased mobility in regards to bed mobility, transfers, gt quality and tolerance, balance, stair negotiation and safety due to R TSA rev surgery. Decreased AROM of R shoulder, dec strength of R shoulder, dec sensation of R UE due to block effects also impacting pt functional mobility. Pt rating pain on numerical pain scale pre/post and during session 4/10. Pt ed regarding mobility safety, HEP, ice application/use, sling donning/doffing environmental safety and home safe techniques. Pt sitting on EOB upon arrival.  Pt able to perform sit<>stand w/ CGA/SBA. Safety vc required throughout session to reinforce safety. Pt able to participate in gt training using GB, R sling UE and CGA w/ antalgic gt pattern.   Pt was able to participate in stair training using step to pattern, L rail and CGA. Answered questions by pt in regards to PT and mobility. Pt left sitting EOB w/ all needs within reach. Nurse Kendra aware of session and outcomes. Recommend HHPT with responsible adult care at least 24 hours upon hospital d/c. Patient will benefit from skilled intervention to address the above impairments. Patient's rehabilitation potential is considered to be Good  Factors which may influence rehabilitation potential include:   []         None noted  []         Mental ability/status  []         Medical condition  []         Home/family situation and support systems  []         Safety awareness  [x]         Pain tolerance/management  []         Other:      PLAN :  Recommendations and Planned Interventions:   [x]           Bed Mobility Training             []    Neuromuscular Re-Education  [x]           Transfer Training                   []    Orthotic/Prosthetic Training  [x]           Gait Training                          [x]    Modalities  [x]           Therapeutic Exercises           [x]    Edema Management/Control  [x]           Therapeutic Activities            [x]    Family Training/Education  [x]           Patient Education  []           Other (comment):    Frequency/Duration: Patient will be followed by physical therapy 1-2 times per day/4-7 days per week to address goals. Discharge Recommendations: Home Health  Further Equipment Recommendations for Discharge: N/A     SUBJECTIVE:   Patient stated I am ready to go home.     OBJECTIVE DATA SUMMARY:     Past Medical History:   Diagnosis Date    Arthritis     osteo    COVID-19     History of chemotherapy      Past Surgical History:   Procedure Laterality Date    HX CATARACT REMOVAL  2013    OU    HX CERVICAL DISKECTOMY      HX COLONOSCOPY      HX KNEE ARTHROSCOPY  2012    bilateral    HX LUMBAR FUSION      HX PARTIAL COLECTOMY       Barriers to Learning/Limitations: yes;  anesthesia  Compensate with: Visual Cues, Verbal Cues, and Tactile Cues  Home Situation:  Home Situation  Home Environment: Private residence  # Steps to Enter: 4  Rails to Enter: Yes  Hand Rails : Bilateral  One/Two Story Residence: Two story, live on 1st floor  Living Alone: No  Support Systems: Child(felix), Other Family Member(s)  Patient Expects to be Discharged to[de-identified] House  Current DME Used/Available at Home: Cane, straight  Critical Behavior:  Neurologic State: Alert  Orientation Level: Oriented to person; Oriented to place; Oriented to situation  Cognition: Follows commands  Safety/Judgement: Awareness of environment  Psychosocial  Patient Behaviors: Calm; Cooperative  Purposeful Interaction: Yes  Pt Identified Daily Priority: Clinical issues (comment)  Skin Integrity: Incision (comment) (right shoulder)  Skin Integumentary  Skin Integrity: Incision (comment) (right shoulder)  Strength:    Strength: Generally decreased, functional  Tone & Sensation:   Tone: Normal  Sensation: Impaired (RUE)  Range Of Motion:  AROM: Generally decreased, functional (RUE numb from nerve block)  Posture:  Functional Mobility:  Bed Mobility:  Scooting: Supervision  Transfers:  Sit to Stand: Contact guard assistance; Stand-by assistance (vc)  Stand to Sit: Contact guard assistance; Stand-by assistance (vc)  Balance:   Sitting: Intact  Standing: Intact  Ambulation/Gait Training:  Distance (ft): 160 Feet (ft)  Assistive Device: Gait belt; Brace/Splint  Ambulation - Level of Assistance: Contact guard assistance; Stand-by assistance (vc)  Gait Abnormalities: Altered arm swing  Base of Support: Widened  Stance: Weight shift; Time  Speed/Caroline: Slow  Step Length: Left shortened; Right shortened  Interventions: Safety awareness training; Tactile cues; Verbal cues;  Visual/Demos  Stairs:  Number of Stairs Trained: 2  Stairs - Level of Assistance: Contact guard assistance (vc)  Rail Use: Left      Therapeutic Exercises:   Instructed and encouraged thumb/finger opposition, , wrist flex/ext, AA elbow flex, AA shoulder flex R UE. Pain:  Pain level pre-treatment: 0/10   Pain level post-treatment: 0/10   Pain Intervention(s) : Medication (see MAR); Rest, Ice, Repositioning  Response to intervention: Nurse notified, See doc flow    Activity Tolerance:   Fair   Please refer to the flowsheet for vital signs taken during this treatment. After treatment:   [x]         Patient left in no apparent distress sitting up EOB  []         Patient left in no apparent distress in bed  [x]         Call bell left within reach  [x]         Nursing notified  [x]         Caregiver present  []         Bed alarm activated  []         SCDs applied    COMMUNICATION/EDUCATION:   [x]         Role of Physical Therapy in the acute care setting. [x]         Fall prevention education was provided and the patient/caregiver indicated understanding. [x]         Patient/family have participated as able in goal setting and plan of care. [x]         Patient/family agree to work toward stated goals and plan of care. []         Patient understands intent and goals of therapy, but is neutral about his/her participation. []         Patient is unable to participate in goal setting/plan of care: ongoing with therapy staff.  []         Other:     Thank you for this referral.  Placido Claros, PT   Time Calculation: 24 mins      Eval Complexity: History: HIGH Complexity :3+ comorbidities / personal factors will impact the outcome/ POC Exam:MEDIUM Complexity : 3 Standardized tests and measures addressing body structure, function, activity limitation and / or participation in recreation  Presentation: LOW Complexity : Stable, uncomplicated  Clinical Decision Making:Low Complexity    Overall Complexity:LOW

## 2021-11-23 NOTE — ANESTHESIA POSTPROCEDURE EVALUATION
Procedure(s):  EXACTECH REVERSE TOTAL RIGHT SHOULDER ARTHROPLASTY GEN WITH SCALENE BLOCK PRN. general    Anesthesia Post Evaluation      Multimodal analgesia: multimodal analgesia used between 6 hours prior to anesthesia start to PACU discharge  Patient location during evaluation: PACU  Level of consciousness: awake and alert  Pain score: 0  Airway patency: patent  Anesthetic complications: no  Cardiovascular status: acceptable  Respiratory status: acceptable  Hydration status: acceptable  Post anesthesia nausea and vomiting:  none  Final Post Anesthesia Temperature Assessment:  Normothermia (36.0-37.5 degrees C)      INITIAL Post-op Vital signs:   Vitals Value Taken Time   /73 11/23/21 1000   Temp 36.5 °C (97.7 °F) 11/23/21 0932   Pulse 85 11/23/21 1002   Resp 17 11/23/21 1002   SpO2 96 % 11/23/21 1002   Vitals shown include unvalidated device data.

## 2021-11-23 NOTE — PERIOP NOTES
Reviewed PTA medication list with patient/caregiver and patient/caregiver denies any additional medications. Patient admits to having a responsible adult care for them at home for at least 24 hours after surgery. Patient encouraged to use gown warming system and informed that using said warming gown to regulate body temperature prior to a procedure has been shown to help reduce the risks of blood clots and infection. Patient's pharmacy of choice verified and documented in PTA medication section. Dual skin assessment & fall risk band verification completed with [facility].

## 2021-11-23 NOTE — PROGRESS NOTES
Problem: Self Care Deficits Care Plan (Adult)  Goal: *Acute Goals and Plan of Care (Insert Text)  Description: Occupational Therapy Goals Initiated 11/23/2021 within 7 day(s). 1. Patient will perform toilet transfer with supervision in preparation for bowel and bladder management. 2. Patient will perform bowel and bladder management with supervision for increased independence with ADLs. 3. Patient will perform UB dressing with supervision while utilizing krystal-techniques for increased independence with ADLs. 4. Patient will perform LB dressing with supervision while utilizing krystal-techniques for increased independence with ADLs. 5. Patient will perform bathing w/ supervision w/ A/E for increased independence with ADLs. 6. Patient will perform grooming standing at sink with supervision for increased independence with ADLs. Outcome: Progressing Towards Goal  OCCUPATIONAL THERAPY EVALUATION    Patient: Arlene Romano (73 y.o. female)  Date: 11/23/2021  Primary Diagnosis: Post-operative state [Z98.890]  Procedure(s) (LRB):  EXACTECH REVERSE TOTAL RIGHT SHOULDER ARTHROPLASTY GEN WITH SCALENE BLOCK PRN (Right) Day of Surgery   Precautions: Fall (shoulder)  PLOF: pt mod I for ADLs/functional mobility    ASSESSMENT AND RECOMMENDATIONS:  Based on the objective data described below, the patient presents with RUE decreased ROM and strength affecting UE ADLs. Pt found seated on toilet with RN, reporting pain 0/10 and RUE numb from nerve block. Pt required CGA to complete bladder hygiene and for clothing management. Pt ambulated back to seated EOB, educated pt on proper body mechanics for ADLs. Also educated on adaptive strategies and clothing modifications. Patient requires mod A to deven/doff sling. Pt required min A to complete upper/lower body dressing. Provided/educated on use of of reacher and long handled sponge/shoe horn for increased independence.  Provided opportunity for pt to voice questions on ADL performance when home, pt has no further concerns. Education: Reviewed home safety, body mechanics, importance of moving every hour to prevent joint stiffness, Pendulum Exercises, roll of ice for edema/pain control, one-handed techniques, proper positioning of sling, and adaptive dressing techniques with patient verbalizing understanding at this time     Patient will benefit from skilled intervention to address the above impairments. Patient's rehabilitation potential is considered to be Good  Factors which may influence rehabilitation potential include:   [x]             None noted  []             Mental ability/status  []             Medical condition  []             Home/family situation and support systems  []             Safety awareness  []             Pain tolerance/management  []             Other:      PLAN :  Recommendations and Planned Interventions:   [x]               Self Care Training                  [x]      Therapeutic Activities  [x]               Functional Mobility Training   []      Cognitive Retraining  [x]               Therapeutic Exercises           [x]      Endurance Activities  []               Balance Training                    []      Neuromuscular Re-Education  []               Visual/Perceptual Training     [x]      Home Safety Training  [x]               Patient Education                   [x]      Family Training/Education  []               Other (comment):    Frequency/Duration: Patient will be followed by Occupational Therapy 1-2 times per day/4-7 days per week to address goals. Discharge Recommendations: Home Health  Further Equipment Recommendations for Discharge: N/A     SUBJECTIVE:   Patient stated I am ready to go.     OBJECTIVE DATA SUMMARY:     Past Medical History:   Diagnosis Date    Arthritis     osteo    COVID-19     History of chemotherapy      Past Surgical History:   Procedure Laterality Date    HX CATARACT REMOVAL  2013    OU    HX CERVICAL DISKECTOMY      HX COLONOSCOPY      HX KNEE ARTHROSCOPY  2012    bilateral    HX LUMBAR FUSION      HX PARTIAL COLECTOMY       Barriers to Learning/Limitations: yes;  physical and other (post-anesthesia)  Compensate with: visual, verbal, tactile, kinesthetic cues/model    Home Situation/Prior Level of Function:   Home Situation  Home Environment: Private residence  # Steps to Enter: 4  Rails to Enter: Yes  Hand Rails : Bilateral  One/Two Story Residence: Two story, live on 1st floor  Living Alone: No  Support Systems: Child(felix), Other Family Member(s)  Patient Expects to be Discharged to[de-identified] House  Current DME Used/Available at Home: Cane, straight  []  Right hand dominant   []  Left hand dominant    Cognitive/Behavioral Status:  Neurologic State: Alert  Orientation Level: Oriented to person; Oriented to place; Oriented to situation  Cognition: Follows commands  Safety/Judgement: Awareness of environment    Skin: R shoulder incision w/ dressing/Mepilex  Edema: compression hose in place & applied ice     Coordination: BUE  Coordination: Generally decreased, functional  Fine Motor Skills-Upper: Right Impaired; Left Intact    Gross Motor Skills-Upper: Right Impaired; Left Intact    Balance:  Sitting: Intact  Standing: Intact    Strength: BUE  Strength: Generally decreased, functional    Tone & Sensation:BUE  Tone: Normal  Sensation: Impaired (RUE)    Range of Motion: BUE  AROM: Generally decreased, functional (RUE numb from nerve block)    Functional Mobility and Transfers for ADLs:    Transfers:  Sit to Stand: Contact guard assistance; Stand-by assistance (vc)    ADL Assessment:  Feeding: Setup; Modified independent  Oral Facial Hygiene/Grooming: Minimum assistance  Bathing: Minimum assistance;  Adaptive equipment  Upper Body Dressing: Minimum assistance  Lower Body Dressing: Minimum assistance  Toileting: Contact guard assistance    ADL Intervention:  Upper Body Dressing Assistance  Dressing Assistance: Minimum assistance  Orthotics(Brace): Moderate assistance  Pullover Shirt: Minimum assistance  Front Opened Shirt: Minimum assistance    Lower Body Dressing Assistance  Dressing Assistance: Minimum assistance  Underpants: Minimum assistance  Pants With Elastic Waist: Minimum assistance  Leg Crossed Method Used: No  Position Performed: Seated edge of bed  Cues: Verbal cues provided; Visual cues provided    Toileting  Toileting Assistance: Contact guard assistance  Bladder Hygiene: Stand-by assistance  Clothing Management: Minimum assistance    Cognitive Retraining  Safety/Judgement: Awareness of environment    Pain:  Pain level pre-treatment: 0/10  Pain level post-treatment: 0/10  Pain Intervention(s): Medication administer by Nursing (see MAR); Rest, Ice, Repositioning   Response to intervention: Nurse notified, see doc flow sheet    Activity Tolerance:   Fair. Patient able to stand ~5 minute(s). Patient able to complete ADLs with intermittent rest breaks. Patient limited by strength, ROM, coordination. Patient unsteady. Please refer to the flowsheet for vital signs taken during this treatment. After treatment:   []  Patient left in no apparent distress sitting up in chair  [x]  Patient sitting on EOB  []  Patient left in no apparent distress in bed  [x]  Call bell left within reach  [x]  Nursing notified  []  Caregiver present  [x]  Ice applied  []  SCD's on while back in bed  [] Bed alarm activated    COMMUNICATION/EDUCATION:   Communication/Collaboration:  [x]       Role of Occupational Therapy in the acute care setting. [x]      Home safety education was provided and the patient/caregiver indicated understanding. [x]      Patient/family have participated as able in goal setting and plan of care. [x]      Patient/family agree to work toward stated goals and plan of care. []      Patient understands intent and goals of therapy, but is neutral about his/her participation.   []      Patient is unable to participate in plan of care at this time. Thank you for this referral.  Gary Farrell OTR/L  Time Calculation: 23 mins    Eval Complexity: History: MEDIUM Complexity : Expanded review of history including physical, cognitive and psychosocial  history ; Examination: MEDIUM Complexity : 3-5 performance deficits relating to physical, cognitive , or psychosocial skils that result in activity limitations and / or participation restrictions; Decision Making:MEDIUM Complexity : Patient may present with comorbidities that affect occupational performnce.  Miniml to moderate modification of tasks or assistance (eg, physical or verbal ) with assesment(s) is necessary to enable patient to complete evaluation

## 2021-11-23 NOTE — INTERVAL H&P NOTE
Update History & Physical    The Patient's History and Physical of November 20, 2021 was reviewed with the patient and I examined the patient. There was no change. The surgical site was confirmed by the patient and me. Plan:  The risk, benefits, expected outcome, and alternative to the recommended procedure have been discussed with the patient. Patient understands and wants to proceed with the procedure.     Electronically signed by Love Carroll MD on 11/23/2021 at 7:19 AM

## 2021-11-23 NOTE — ANESTHESIA PREPROCEDURE EVALUATION
Relevant Problems   No relevant active problems       Anesthetic History   No history of anesthetic complications            Review of Systems / Medical History  Patient summary reviewed, nursing notes reviewed and pertinent labs reviewed    Pulmonary  Within defined limits            Pertinent negatives: No sleep apnea and smoker     Neuro/Psych   Within defined limits           Cardiovascular                  Exercise tolerance: >4 METS     GI/Hepatic/Renal             Pertinent negatives: No hiatal hernia and GERD   Endo/Other        Arthritis     Other Findings              Physical Exam    Airway  Mallampati: II  TM Distance: 4 - 6 cm  Neck ROM: normal range of motion   Mouth opening: Normal     Cardiovascular    Rhythm: regular  Rate: normal         Dental  No notable dental hx       Pulmonary  Breath sounds clear to auscultation               Abdominal  GI exam deferred       Other Findings            Anesthetic Plan    ASA: 2  Anesthesia type: general      Post-op pain plan if not by surgeon: peripheral nerve block single    Induction: Intravenous  Anesthetic plan and risks discussed with: Patient

## 2022-03-19 PROBLEM — M19.011 DJD OF RIGHT SHOULDER: Status: ACTIVE | Noted: 2021-11-23

## 2022-03-19 PROBLEM — Z98.890 POST-OPERATIVE STATE: Status: ACTIVE | Noted: 2021-11-23

## 2022-03-30 ENCOUNTER — TRANSCRIBE ORDER (OUTPATIENT)
Dept: REGISTRATION | Age: 77
End: 2022-03-30

## 2022-03-30 ENCOUNTER — HOSPITAL ENCOUNTER (OUTPATIENT)
Dept: PREADMISSION TESTING | Age: 77
Discharge: HOME | End: 2022-03-30
Payer: MEDICARE

## 2022-03-30 DIAGNOSIS — M47.22 CERVICAL SPONDYLOSIS WITH RADICULOPATHY: ICD-10-CM

## 2022-03-30 DIAGNOSIS — M47.22 CERVICAL SPONDYLOSIS WITH RADICULOPATHY: Primary | ICD-10-CM

## 2022-03-30 LAB
ALBUMIN SERPL-MCNC: 3.5 G/DL (ref 3.4–5)
ALBUMIN/GLOB SERPL: 1 {RATIO} (ref 0.8–1.7)
ALP SERPL-CCNC: 166 U/L (ref 45–117)
ALT SERPL-CCNC: 47 U/L (ref 13–56)
ANION GAP SERPL CALC-SCNC: 4 MMOL/L (ref 3–18)
APTT PPP: 29 SEC (ref 23–36.4)
AST SERPL-CCNC: 38 U/L (ref 10–38)
ATRIAL RATE: 75 BPM
BILIRUB SERPL-MCNC: 0.3 MG/DL (ref 0.2–1)
BUN SERPL-MCNC: 24 MG/DL (ref 7–18)
BUN/CREAT SERPL: 25 (ref 12–20)
CALCIUM SERPL-MCNC: 9 MG/DL (ref 8.5–10.1)
CALCULATED P AXIS, ECG09: 79 DEGREES
CALCULATED R AXIS, ECG10: 83 DEGREES
CALCULATED T AXIS, ECG11: 93 DEGREES
CHLORIDE SERPL-SCNC: 108 MMOL/L (ref 100–111)
CO2 SERPL-SCNC: 29 MMOL/L (ref 21–32)
CREAT SERPL-MCNC: 0.97 MG/DL (ref 0.6–1.3)
DIAGNOSIS, 93000: NORMAL
ERYTHROCYTE [DISTWIDTH] IN BLOOD BY AUTOMATED COUNT: 13.4 % (ref 11.6–14.5)
GLOBULIN SER CALC-MCNC: 3.4 G/DL (ref 2–4)
GLUCOSE SERPL-MCNC: 122 MG/DL (ref 74–99)
HCT VFR BLD AUTO: 34.2 % (ref 35–45)
HGB BLD-MCNC: 10.9 G/DL (ref 12–16)
INR PPP: 1 (ref 0.8–1.2)
MCH RBC QN AUTO: 28.2 PG (ref 24–34)
MCHC RBC AUTO-ENTMCNC: 31.9 G/DL (ref 31–37)
MCV RBC AUTO: 88.6 FL (ref 78–100)
NRBC # BLD: 0 K/UL (ref 0–0.01)
NRBC BLD-RTO: 0 PER 100 WBC
P-R INTERVAL, ECG05: 148 MS
PLATELET # BLD AUTO: 284 K/UL (ref 135–420)
PMV BLD AUTO: 8.9 FL (ref 9.2–11.8)
POTASSIUM SERPL-SCNC: 3.7 MMOL/L (ref 3.5–5.5)
PROT SERPL-MCNC: 6.9 G/DL (ref 6.4–8.2)
PROTHROMBIN TIME: 13 SEC (ref 11.5–15.2)
Q-T INTERVAL, ECG07: 430 MS
QRS DURATION, ECG06: 128 MS
QTC CALCULATION (BEZET), ECG08: 480 MS
RBC # BLD AUTO: 3.86 M/UL (ref 4.2–5.3)
SODIUM SERPL-SCNC: 141 MMOL/L (ref 136–145)
VENTRICULAR RATE, ECG03: 75 BPM
WBC # BLD AUTO: 6.5 K/UL (ref 4.6–13.2)

## 2022-03-30 PROCEDURE — 80053 COMPREHEN METABOLIC PANEL: CPT

## 2022-03-30 PROCEDURE — 36415 COLL VENOUS BLD VENIPUNCTURE: CPT

## 2022-03-30 PROCEDURE — 85730 THROMBOPLASTIN TIME PARTIAL: CPT

## 2022-03-30 PROCEDURE — 85027 COMPLETE CBC AUTOMATED: CPT

## 2022-03-30 PROCEDURE — 93005 ELECTROCARDIOGRAM TRACING: CPT

## 2022-03-30 PROCEDURE — 85610 PROTHROMBIN TIME: CPT

## 2022-03-31 LAB
BACTERIA SPEC CULT: NORMAL
BACTERIA SPEC CULT: NORMAL
SERVICE CMNT-IMP: NORMAL

## 2022-04-05 NOTE — NURSE NAVIGATOR
Patrice Flores watched the preoperative spine seminar online and received a preoperative spine education book in anticipation of surgery.      Nurse Navigator

## 2022-04-06 ENCOUNTER — HOSPITAL ENCOUNTER (OUTPATIENT)
Dept: PREADMISSION TESTING | Age: 77
Discharge: HOME | End: 2022-04-06

## 2022-04-06 VITALS — WEIGHT: 150 LBS | HEIGHT: 59 IN | BODY MASS INDEX: 30.24 KG/M2

## 2022-04-06 RX ORDER — CEFAZOLIN SODIUM/WATER 2 G/20 ML
2 SYRINGE (ML) INTRAVENOUS ONCE
Status: CANCELLED | OUTPATIENT
Start: 2022-04-06 | End: 2022-04-06

## 2022-04-06 RX ORDER — SODIUM CHLORIDE, SODIUM LACTATE, POTASSIUM CHLORIDE, CALCIUM CHLORIDE 600; 310; 30; 20 MG/100ML; MG/100ML; MG/100ML; MG/100ML
125 INJECTION, SOLUTION INTRAVENOUS CONTINUOUS
Status: CANCELLED | OUTPATIENT
Start: 2022-04-06

## 2022-04-06 NOTE — PERIOP NOTES
PAT phone interview completed. Patient made aware to be NPO. Patient stated she had two doses of COVID vaccine. Patient made aware not to get COVID test. Patient stated she has a ride for discharge and someone to stay with her for 24 hours after procedure. Patient made aware not to wear jewelry, lotion, oil or spray on DOS. Reviewed surgical skin preparation with patient. Patient stated understanding. Opportunity for questions given. Patient stated she does not have a DNR order.

## 2022-04-14 PROBLEM — M54.10 RADICULOPATHY OF ARM: Status: ACTIVE | Noted: 2022-04-14

## 2022-04-14 PROBLEM — M48.02 CERVICAL SPINAL STENOSIS: Status: ACTIVE | Noted: 2022-04-14

## 2022-04-14 PROBLEM — M47.812 CERVICAL SPONDYLOSIS: Status: ACTIVE | Noted: 2022-04-14

## 2022-04-14 NOTE — H&P
History and Physical        Patient: Madison Barker               Sex: female          DOA: (Not on file)         YOB: 1945      Age:  64 y.o.        LOS:  LOS: 0 days        HPI:     Madison Barker is a 64 y.o. female who complains of neck and upper extremity pain. She has pain in the  bilaterally cervical paraspinals and then travels into the bilaterally upper extremity. She has numbness/tingling in the same distribution of their pain. She denies weakness in the bilateral upper extremity. She denies any fevers, chills or night sweats. At this time the patient has failed conservative care including anti-inflammatories, analgesics, physical therapy and injections. Shewould like to move forward with surgical intervention since their pain affects their activities of daily living. Past Medical History:   Diagnosis Date    Arthritis     osteo    Colon cancer ([facility] [address]     possibly, was suggested to do chemotherapy in case    COVID-19     History of chemotherapy        Past Surgical History:   Procedure Laterality Date    HX CATARACT REMOVAL      OU    HX CERVICAL DISKECTOMY      HX COLONOSCOPY      HX KNEE ARTHROSCOPY  2012    bilateral    HX KNEE REPLACEMENT      HX KNEE REPLACEMENT      HX LUMBAR FUSION      HX PARTIAL COLECTOMY      HX SHOULDER REPLACEMENT Right        Family History   Problem Relation Age of Onset    Malignant Hyperthermia Neg Hx     Pseudocholinesterase Deficiency Neg Hx     Delayed Awakening Neg Hx     Post-op Nausea/Vomiting Neg Hx     Emergence Delirium Neg Hx     Post-op Cognitive Dysfunction Neg Hx        Social History     Socioeconomic History    Marital status: SINGLE   Tobacco Use    Smoking status: Former Smoker     Quit date: 1970     Years since quittin.3    Smokeless tobacco: Never Used   Substance and Sexual Activity    Alcohol use:  Yes     Alcohol/week: 0.8 standard drinks     Types: 1 Glasses of wine per week     Comment: occasional    Drug use: No       Prior to Admission medications    Medication Sig Start Date End Date Taking? Authorizing Provider   multivitamin (ONE A DAY) tablet Take 1 Tablet by mouth daily. Provider, Historical   calcium citrate-vitamin d3 (CITRACAL+D) 315 mg-5 mcg (200 unit) tab Take 1 Tablet by mouth daily (with breakfast). Provider, Historical   TURMERIC PO Take  by mouth. Provider, Historical   ferrous fumarate/vit Bcomp,C (SUPER B COMPLEX PO) Take  by mouth. Provider, Historical   lisinopriL (PRINIVIL, ZESTRIL) 5 mg tablet Take  by mouth daily. Provider, Historical   loperamide (ANTI-DIARRHEAL, LOPERAMIDE,) 2 mg capsule Take 2 mg by mouth daily. Indications: DIARRHEA    Provider, Historical       Allergies   Allergen Reactions    Codeine Nausea Only       Review of Systems  A comprehensive review of systems was negative except for that written in the History of Present Illness. Physical Exam:      There were no vitals taken for this visit. Physical Exam:  General: Alert and Oriented X 3  Lungs: Clear to ausculation bilaterally  Cardiovascular: Regular Rate and Rhythm, without murmur  Abdomen: Soft, nontender with positive bowel sounds in all four quadrants  Gential/Rectal: deferred  Musculoskeletal: The patient has full range of motion in the cervical spine in flexion, extension and side rotation bilaterally. The patient has pain with flexion or extension. The patient has tenderness over the bilateral cervical paraspinals. The patient is neurologically intact in the upper extremities. Pulses are 2+. Radiographic evaluation shows cervical spondylosis C3-C5       Labs Reviewed: All lab results for the last 24 hours reviewed. Assessment/Plan     Principal Problem:    Cervical spinal stenosis (4/14/2022)    Active Problems:    Cervical spondylosis (4/14/2022)      Radiculopathy of arm (4/14/2022)        We are going to move forward with an ACDF C3-C5.   The risks vs the benefits have been discussed with the patient. The patient will follow-up in the office 10 days postoperatively and call with any and all concerns.

## 2022-04-20 ENCOUNTER — ANESTHESIA EVENT (OUTPATIENT)
Dept: SURGERY | Age: 77
End: 2022-04-20
Payer: MEDICARE

## 2022-04-20 NOTE — ANESTHESIA PREPROCEDURE EVALUATION
Relevant Problems   No relevant active problems       Anesthetic History        Pertinent negatives: No PONV       Review of Systems / Medical History  Patient summary reviewed, nursing notes reviewed and pertinent labs reviewed    Pulmonary          Smoker (former, quit 1970)    Pertinent negatives: No asthma (pt s/p covid 2 years ago, placed on maintenance asthma therapy since)     Neuro/Psych     seizures      Pertinent negatives: No CVA   Cardiovascular                Pertinent negatives: No hypertension and past MI  Exercise tolerance: >4 METS  Comments: Echo: 2020 NORMAL LEFT VENTRICULAR CAVITY SIZE.    MILDLY INCREASED LEFT VENTRICULAR WALL THICKNESS.    NORMAL GLOBAL LEFT VENTRICULAR SYSTOLIC FUNCTION.   [address] IS 60%.    NORMAL LEFT ATRIAL SIZE.    NORMAL RIGHT VENTRICULAR SIZE WITH DECREASED SYSTOLIC FUNCTION.    NORMAL RIGHT ATRIAL SIZE.    MILDLY THICKENED MV WITH NORMAL EXCURSION.    SCLEROTIC AOV WITHOUT STENOSIS.    TRACE TRICUSPID REGURGITATION.    ESTIMATED RIGHT VENTRICULAR SYSTOLIC PRESSURE IS 78RGMZ.    PV NOT INTERROGATED.   [facility].    NO MASSES, SHUNTS OR THROMBI SEEN.   :   GI/Hepatic/Renal             Pertinent negatives: No GERD, liver disease and renal disease   Endo/Other        Obesity, arthritis and cancer (h/o colon CA)     Other Findings            Physical Exam    Airway  Mallampati: II  TM Distance: < 4 cm  Neck ROM: normal range of motion   Mouth opening: Normal     Cardiovascular    Rhythm: regular  Rate: normal         Dental  No notable dental hx       Pulmonary  Breath sounds clear to auscultation               Abdominal  GI exam deferred       Other Findings            Anesthetic Plan    ASA: 2  Anesthesia type: general          Induction: Intravenous  Anesthetic plan and risks discussed with: Patient      Plan general anesthesia.   Will have patient extend neck as far as comfortable prior to induction and will not extend further during intubation. Patient understands and accepts all risks associated with general anesthesia as outlined on the anesthesia consent form. Patient given opportunity to ask questions and all questions answered. Anesthesia consent form signed by patient.

## 2022-04-21 ENCOUNTER — HOSPITAL ENCOUNTER (OUTPATIENT)
Age: 77
Setting detail: OUTPATIENT SURGERY
Discharge: HOME | End: 2022-04-21
Attending: ORTHOPAEDIC SURGERY | Admitting: ORTHOPAEDIC SURGERY
Payer: MEDICARE

## 2022-04-21 ENCOUNTER — APPOINTMENT (OUTPATIENT)
Dept: GENERAL RADIOLOGY | Age: 77
End: 2022-04-21
Attending: ORTHOPAEDIC SURGERY
Payer: MEDICARE

## 2022-04-21 ENCOUNTER — ANESTHESIA (OUTPATIENT)
Dept: SURGERY | Age: 77
End: 2022-04-21
Payer: MEDICARE

## 2022-04-21 VITALS
HEIGHT: 59 IN | DIASTOLIC BLOOD PRESSURE: 73 MMHG | WEIGHT: 151.9 LBS | TEMPERATURE: 98.2 F | BODY MASS INDEX: 30.62 KG/M2 | OXYGEN SATURATION: 95 % | RESPIRATION RATE: 12 BRPM | SYSTOLIC BLOOD PRESSURE: 149 MMHG | HEART RATE: 92 BPM

## 2022-04-21 DIAGNOSIS — M54.10 RADICULOPATHY OF ARM: Primary | ICD-10-CM

## 2022-04-21 PROCEDURE — 74011250636 HC RX REV CODE- 250/636: Performed by: NURSE ANESTHETIST, CERTIFIED REGISTERED

## 2022-04-21 PROCEDURE — 74011000250 HC RX REV CODE- 250: Performed by: NURSE ANESTHETIST, CERTIFIED REGISTERED

## 2022-04-21 PROCEDURE — C1713 ANCHOR/SCREW BN/BN,TIS/BN: HCPCS | Performed by: ORTHOPAEDIC SURGERY

## 2022-04-21 PROCEDURE — 76060000035 HC ANESTHESIA 2 TO 2.5 HR: Performed by: ORTHOPAEDIC SURGERY

## 2022-04-21 PROCEDURE — 76010000131 HC OR TIME 2 TO 2.5 HR: Performed by: ORTHOPAEDIC SURGERY

## 2022-04-21 PROCEDURE — 77030040504 HC DRN WND MDII -B: Performed by: ORTHOPAEDIC SURGERY

## 2022-04-21 PROCEDURE — 74011000250 HC RX REV CODE- 250: Performed by: ORTHOPAEDIC SURGERY

## 2022-04-21 PROCEDURE — 77030008683 HC TU ET CUF COVD -A: Performed by: NURSE ANESTHETIST, CERTIFIED REGISTERED

## 2022-04-21 PROCEDURE — 74011250636 HC RX REV CODE- 250/636: Performed by: ORTHOPAEDIC SURGERY

## 2022-04-21 PROCEDURE — 77030040506 HC DRN WND MDII -A: Performed by: ORTHOPAEDIC SURGERY

## 2022-04-21 PROCEDURE — 77030034475 HC MISC IMPL SPN: Performed by: ORTHOPAEDIC SURGERY

## 2022-04-21 PROCEDURE — 77030040361 HC SLV COMPR DVT MDII -B: Performed by: ORTHOPAEDIC SURGERY

## 2022-04-21 PROCEDURE — 77030033138 HC SUT PGA STRATFX J&J -B: Performed by: ORTHOPAEDIC SURGERY

## 2022-04-21 PROCEDURE — 76210000021 HC REC RM PH II 0.5 TO 1 HR: Performed by: ORTHOPAEDIC SURGERY

## 2022-04-21 PROCEDURE — 77030018390 HC SPNG HEMSTAT2 J&J -B: Performed by: ORTHOPAEDIC SURGERY

## 2022-04-21 PROCEDURE — 76210000006 HC OR PH I REC 0.5 TO 1 HR: Performed by: ORTHOPAEDIC SURGERY

## 2022-04-21 PROCEDURE — 77030010507 HC ADH SKN DERMBND J&J -B: Performed by: ORTHOPAEDIC SURGERY

## 2022-04-21 PROCEDURE — 74011000272 HC RX REV CODE- 272: Performed by: ORTHOPAEDIC SURGERY

## 2022-04-21 PROCEDURE — 74011250637 HC RX REV CODE- 250/637: Performed by: ANESTHESIOLOGY

## 2022-04-21 PROCEDURE — 77030020782 HC GWN BAIR PAWS FLX 3M -B: Performed by: ORTHOPAEDIC SURGERY

## 2022-04-21 PROCEDURE — 77030006643: Performed by: NURSE ANESTHETIST, CERTIFIED REGISTERED

## 2022-04-21 PROCEDURE — 77030008477 HC STYL SATN SLP COVD -A: Performed by: NURSE ANESTHETIST, CERTIFIED REGISTERED

## 2022-04-21 PROCEDURE — 2709999900 HC NON-CHARGEABLE SUPPLY: Performed by: ORTHOPAEDIC SURGERY

## 2022-04-21 PROCEDURE — 77030003028 HC SUT VCRL J&J -A: Performed by: ORTHOPAEDIC SURGERY

## 2022-04-21 PROCEDURE — 77030027138 HC INCENT SPIROMETER -A: Performed by: ORTHOPAEDIC SURGERY

## 2022-04-21 DEVICE — BONE SCREW, LOCKING, 3.5MM X 14MM
Type: IMPLANTABLE DEVICE | Site: SPINE CERVICAL | Status: FUNCTIONAL
Brand: ENDOSKELETON® TCS

## 2022-04-21 DEVICE — SCREW 3125313 4.5 X 13 SELF TAP VAR
Type: IMPLANTABLE DEVICE | Site: SPINE CERVICAL | Status: FUNCTIONAL
Brand: ATLANTIS® ANTERIOR CERVICAL PLATE SYSTEM

## 2022-04-21 DEVICE — BONE SCREW, LOCKING, 3.8MM X 14MM
Type: IMPLANTABLE DEVICE | Site: SPINE CERVICAL | Status: FUNCTIONAL
Brand: ENDOSKELETON® TCS

## 2022-04-21 DEVICE — INTERBODY FUSION DEVICE  6 DEGREE MEDIUM 6MM
Type: IMPLANTABLE DEVICE | Site: SPINE CERVICAL | Status: FUNCTIONAL
Brand: ENDOSKELETON® TC NANOLOCK® SURFACE TECHNOLOGY

## 2022-04-21 DEVICE — INTERBODY FUSION DEVICE  6 DEGREE MEDIUM 7MM
Type: IMPLANTABLE DEVICE | Site: SPINE CERVICAL | Status: FUNCTIONAL
Brand: ENDOSKELETON® TCS NANOLOCK® SURFACE TECHNOLOGY

## 2022-04-21 RX ORDER — SODIUM CHLORIDE, SODIUM LACTATE, POTASSIUM CHLORIDE, CALCIUM CHLORIDE 600; 310; 30; 20 MG/100ML; MG/100ML; MG/100ML; MG/100ML
125 INJECTION, SOLUTION INTRAVENOUS CONTINUOUS
Status: DISCONTINUED | OUTPATIENT
Start: 2022-04-21 | End: 2022-04-21 | Stop reason: HOSPADM

## 2022-04-21 RX ORDER — CEPHALEXIN 500 MG/1
500 CAPSULE ORAL 4 TIMES DAILY
Qty: 28 CAPSULE | Refills: 0 | Status: SHIPPED | OUTPATIENT
Start: 2022-04-21

## 2022-04-21 RX ORDER — ACETAMINOPHEN 500 MG
1000 TABLET ORAL ONCE
Status: COMPLETED | OUTPATIENT
Start: 2022-04-21 | End: 2022-04-21

## 2022-04-21 RX ORDER — HYDROMORPHONE HYDROCHLORIDE 1 MG/ML
0.5 INJECTION, SOLUTION INTRAMUSCULAR; INTRAVENOUS; SUBCUTANEOUS
Status: DISCONTINUED | OUTPATIENT
Start: 2022-04-21 | End: 2022-04-21 | Stop reason: HOSPADM

## 2022-04-21 RX ORDER — NALOXONE HYDROCHLORIDE 0.4 MG/ML
0.4 INJECTION, SOLUTION INTRAMUSCULAR; INTRAVENOUS; SUBCUTANEOUS AS NEEDED
Status: DISCONTINUED | OUTPATIENT
Start: 2022-04-21 | End: 2022-04-21 | Stop reason: HOSPADM

## 2022-04-21 RX ORDER — LIDOCAINE HYDROCHLORIDE 20 MG/ML
INJECTION, SOLUTION EPIDURAL; INFILTRATION; INTRACAUDAL; PERINEURAL AS NEEDED
Status: DISCONTINUED | OUTPATIENT
Start: 2022-04-21 | End: 2022-04-21 | Stop reason: HOSPADM

## 2022-04-21 RX ORDER — ROCURONIUM BROMIDE 10 MG/ML
INJECTION, SOLUTION INTRAVENOUS AS NEEDED
Status: DISCONTINUED | OUTPATIENT
Start: 2022-04-21 | End: 2022-04-21 | Stop reason: HOSPADM

## 2022-04-21 RX ORDER — SODIUM CHLORIDE 0.9 % (FLUSH) 0.9 %
5-40 SYRINGE (ML) INJECTION AS NEEDED
Status: DISCONTINUED | OUTPATIENT
Start: 2022-04-21 | End: 2022-04-21 | Stop reason: HOSPADM

## 2022-04-21 RX ORDER — OXYCODONE HYDROCHLORIDE 5 MG/1
5 TABLET ORAL
Status: DISCONTINUED | OUTPATIENT
Start: 2022-04-21 | End: 2022-04-21 | Stop reason: HOSPADM

## 2022-04-21 RX ORDER — CEFAZOLIN SODIUM/WATER 2 G/20 ML
2 SYRINGE (ML) INTRAVENOUS ONCE
Status: COMPLETED | OUTPATIENT
Start: 2022-04-21 | End: 2022-04-21

## 2022-04-21 RX ORDER — OXYCODONE HYDROCHLORIDE 5 MG/1
5 TABLET ORAL
Qty: 28 TABLET | Refills: 0 | Status: SHIPPED | OUTPATIENT
Start: 2022-04-21 | End: 2022-04-28

## 2022-04-21 RX ORDER — FENTANYL CITRATE 50 UG/ML
50 INJECTION, SOLUTION INTRAMUSCULAR; INTRAVENOUS AS NEEDED
Status: DISCONTINUED | OUTPATIENT
Start: 2022-04-21 | End: 2022-04-21 | Stop reason: HOSPADM

## 2022-04-21 RX ORDER — NEOSTIGMINE METHYLSULFATE 1 MG/ML
INJECTION, SOLUTION INTRAVENOUS AS NEEDED
Status: DISCONTINUED | OUTPATIENT
Start: 2022-04-21 | End: 2022-04-21 | Stop reason: HOSPADM

## 2022-04-21 RX ORDER — SODIUM CHLORIDE 0.9 % (FLUSH) 0.9 %
5-40 SYRINGE (ML) INJECTION EVERY 8 HOURS
Status: DISCONTINUED | OUTPATIENT
Start: 2022-04-21 | End: 2022-04-21 | Stop reason: HOSPADM

## 2022-04-21 RX ORDER — HYDROMORPHONE HYDROCHLORIDE 2 MG/ML
INJECTION, SOLUTION INTRAMUSCULAR; INTRAVENOUS; SUBCUTANEOUS AS NEEDED
Status: DISCONTINUED | OUTPATIENT
Start: 2022-04-21 | End: 2022-04-21 | Stop reason: HOSPADM

## 2022-04-21 RX ORDER — DEXTROMETHORPHAN HYDROBROMIDE, GUAIFENESIN 5; 100 MG/5ML; MG/5ML
650 LIQUID ORAL DAILY
COMMUNITY

## 2022-04-21 RX ORDER — GLYCOPYRROLATE 0.2 MG/ML
INJECTION INTRAMUSCULAR; INTRAVENOUS AS NEEDED
Status: DISCONTINUED | OUTPATIENT
Start: 2022-04-21 | End: 2022-04-21 | Stop reason: HOSPADM

## 2022-04-21 RX ORDER — EPHEDRINE SULFATE/0.9% NACL/PF 50 MG/5 ML
SYRINGE (ML) INTRAVENOUS AS NEEDED
Status: DISCONTINUED | OUTPATIENT
Start: 2022-04-21 | End: 2022-04-21 | Stop reason: HOSPADM

## 2022-04-21 RX ORDER — FLUMAZENIL 0.1 MG/ML
0.2 INJECTION INTRAVENOUS
Status: DISCONTINUED | OUTPATIENT
Start: 2022-04-21 | End: 2022-04-21 | Stop reason: HOSPADM

## 2022-04-21 RX ORDER — ONDANSETRON 2 MG/ML
INJECTION INTRAMUSCULAR; INTRAVENOUS AS NEEDED
Status: DISCONTINUED | OUTPATIENT
Start: 2022-04-21 | End: 2022-04-21 | Stop reason: HOSPADM

## 2022-04-21 RX ORDER — MIDAZOLAM HYDROCHLORIDE 1 MG/ML
INJECTION, SOLUTION INTRAMUSCULAR; INTRAVENOUS AS NEEDED
Status: DISCONTINUED | OUTPATIENT
Start: 2022-04-21 | End: 2022-04-21 | Stop reason: HOSPADM

## 2022-04-21 RX ORDER — PROPOFOL 10 MG/ML
INJECTION, EMULSION INTRAVENOUS AS NEEDED
Status: DISCONTINUED | OUTPATIENT
Start: 2022-04-21 | End: 2022-04-21 | Stop reason: HOSPADM

## 2022-04-21 RX ORDER — FENTANYL CITRATE 50 UG/ML
INJECTION, SOLUTION INTRAMUSCULAR; INTRAVENOUS AS NEEDED
Status: DISCONTINUED | OUTPATIENT
Start: 2022-04-21 | End: 2022-04-21 | Stop reason: HOSPADM

## 2022-04-21 RX ADMIN — Medication 2 G: at 15:04

## 2022-04-21 RX ADMIN — FENTANYL CITRATE 50 MCG: 50 INJECTION, SOLUTION INTRAMUSCULAR; INTRAVENOUS at 14:57

## 2022-04-21 RX ADMIN — SODIUM CHLORIDE, SODIUM LACTATE, POTASSIUM CHLORIDE, AND CALCIUM CHLORIDE 125 ML/HR: 600; 310; 30; 20 INJECTION, SOLUTION INTRAVENOUS at 12:07

## 2022-04-21 RX ADMIN — PROPOFOL 150 MG: 10 INJECTION, EMULSION INTRAVENOUS at 14:58

## 2022-04-21 RX ADMIN — SODIUM CHLORIDE, SODIUM LACTATE, POTASSIUM CHLORIDE, AND CALCIUM CHLORIDE: 600; 310; 30; 20 INJECTION, SOLUTION INTRAVENOUS at 16:40

## 2022-04-21 RX ADMIN — GLYCOPYRROLATE 0.4 MG: 0.2 INJECTION INTRAMUSCULAR; INTRAVENOUS at 16:41

## 2022-04-21 RX ADMIN — Medication 3 MG: at 16:41

## 2022-04-21 RX ADMIN — GLYCOPYRROLATE 0.2 MG: 0.2 INJECTION INTRAMUSCULAR; INTRAVENOUS at 14:52

## 2022-04-21 RX ADMIN — HYDROMORPHONE HYDROCHLORIDE 1 MG: 2 INJECTION, SOLUTION INTRAMUSCULAR; INTRAVENOUS; SUBCUTANEOUS at 15:23

## 2022-04-21 RX ADMIN — FENTANYL CITRATE 50 MCG: 50 INJECTION, SOLUTION INTRAMUSCULAR; INTRAVENOUS at 15:18

## 2022-04-21 RX ADMIN — ACETAMINOPHEN 1000 MG: 500 TABLET ORAL at 11:51

## 2022-04-21 RX ADMIN — MIDAZOLAM 2 MG: 1 INJECTION INTRAMUSCULAR; INTRAVENOUS at 14:50

## 2022-04-21 RX ADMIN — ROCURONIUM BROMIDE 40 MG: 10 INJECTION, SOLUTION INTRAVENOUS at 14:59

## 2022-04-21 RX ADMIN — LIDOCAINE HYDROCHLORIDE 60 MG: 20 INJECTION, SOLUTION INTRAVENOUS at 14:58

## 2022-04-21 RX ADMIN — ONDANSETRON HYDROCHLORIDE 4 MG: 2 INJECTION INTRAMUSCULAR; INTRAVENOUS at 14:52

## 2022-04-21 RX ADMIN — Medication 5 MG: at 16:08

## 2022-04-21 NOTE — OP NOTES
OPERATIVE NOTE    Patient: Ashish Ojeda MRN: [id number]  SSN: [id number]    YOB: 1945  Age: 64 y.o. Sex: female      Indications: This is a 64y.o. year-old female who presents with neck pain. She was positive for stenosis per MRI. The patient was admitted for surgery as conservative measures have failed. Date of Procedure: 4/21/2022     Preoperative Diagnosis: SPONDYLOSIS WITH RADICULOPATHY CERVICAL REGION    Postoperative Diagnosis: SPONDYLOSIS WITH RADICULOPATHY CERVICAL REGION      Procedure: Procedure(s):  ANTERIOR CERVICAL DISCECTOMY AND FUSION,CERVICAL 3-CERVICAL 5 W/C-ARM    Surgeon:  Fili Pepe DO ,Surgical Assistant: Rc Hart NP    Assistant: Grace Diaz RN  Circ-Relief: Myrna Rosales RN  Physician Assistant: Flaca Mak PA-C  Radiology Technician: Jelena Oneal  Scrub Tech-1: Amy [place]  Scrub Tech-Relief: Aiden Vivar  Surg Asst-1: [facility]  Nurse Practitioner: Irina Gayle NP    Anesthesia: general    Estimated Blood Loss: 40 ml    Specimens: * No specimens in log *     Drains: Hemovac    Implants:   Implant Name Type Inv.  Item Serial No.  Lot No. LRB No. Used Action   [place] Demineralized Bone Matrix Putty 3cc   [id number] MEDTRONIC  N/A 1 Implanted   CAGE SPNL LORDTC 6 DEG MED [id number] MM ENDOSKELETON TCS - [id number]  CAGE SPNL LORDTC 6 DEG MED [id number] MM ENDOSKELETON TCS  MEDTRONIC SOFAMOR DANEK_WD [id number] N/A 1 Implanted   CAGE SPNL 6 DEG MED [id number] MM ENDOSKELETON TC - [id number]  CAGE SPNL 6 DEG MED [id number] MM ENDOSKELETON TC  MEDTRONIC SOFAMOR DANEK_WD [id number] N/A 1 Implanted   PLATE SPNL E97AD ANT CERV ATLNTS VISN ELITE - [id number]  PLATE SPNL M10VT ANT CERV ATLNTS VISN ELITE  MEDTRONIC [facility] DANEK_WD [id number] N/A 1 Implanted   SCREW SPNL LCK 3.5X14 MM CERV ENDOSKELETON TCS - [id number]  SCREW SPNL LCK 3.5X14 MM CERV ENDOSKELETON TCS  MEDTRONIC SOFAMOR DANEK_WD [id number] N/A 1 Implanted   SCREW SPNL LCK 3.8X14 MM CERV ENDOSKELETON TCS - [id number]  SCREW SPNL LCK 3.8X14 MM CERV ENDOSKELETON TCS  MEDTRONIC SOFAMOR DANEK_WD [id number] N/A 1 Implanted   SCREW SPNL L14MM DIA4MM ST PASQUALE ANG FOR ANT CERV PLT SYS - [id number]  SCREW SPNL L14MM DIA4MM ST PASQUALE ANG FOR ANT CERV PLT SYS  MEDTRONIC SOFAMOR DANEK_WD [id number] N/A 2 Implanted   13mm ST Rescue     MEDTRONIC [id number] N/A 2 Implanted       Complications: None; patient tolerated the procedure well. Procedure: The patient was greeted by Anesthesia and taken to the operative suite, where the patient underwent general endotracheal anesthesia. The patient was positioned in the supine positioned in the supine position on a standard radiolucent Berny spine table. The head was held in 5 pounds of traction through the mandible and the occiput, utilizing a Holter apparatus. A towel roll was placed between the patient's shoulders to allow gentle extension of the cervical spine, and the arms were held at the patient's side. There cervical spine was sterilely prepped and draped in a standard fashion. Fluoroscopy confirmed the Cervical level 3-5, and a 2inch incision was made over the left anterior cervical spine in line with the disk space. The platysma was transected and undermined. The investing fascia over the medial border of the sternocleidomastoid muscle was sharply dissected. Blunt dissection was utilized to palpate the carotid pulsation and to dissect over the osteophytes of the cervical spine. The longus coli muscles were mobilized with electrocautery and retractors were positioned to protect the soft tissue and neurovascular structures. A needle was positioned in the Cervical 4-5disk space and confirmed fluoroscopically to be the appropriate level. A rongeur was utilized to remove all osteophytes over the anterior border of Cervical level 3-5.   A complete diskectomy was performed at Cervical level 3-5for purposes of decompression, secondary to spinal stenosis, utilizing a combination of pituitary rongeurs, Kerrison rongeurs, and curettes. The curettes were utilized to completely remove all cartilage from the superior and the inferior endplate to expose the underlying endplate and create a bleeding surface. The posterior uncinate spurs were completely resect ed, as well as a complete resection of the posterior longitudinal ligament. At the completion of the decompression, a nerve hook could be passed out each neural foramen. There was no residual stenosis noted. The disk space had been squared off and rasped. Progressive trial spacers were positioned at each level for trial.  The most appropriate height and width cage was placed. An appropriate width and sized Titanium TCS threaded machine interbody cage was chosen for C4-5 secondary to plate at the level below that precluded placement of caudal plate. The DBF biological  was placed inside the Titanium TCS cage, and subsequently impacted into the Cervical 4-5 disk space for disk height restoration, lordosis correction, and interbody fusion. My Physician Assistant was utilized as a co-surgeon for this case to include vascular retraction, suction, graft sizing, screw placement, irrigation and final closure of surgical incision. This assistance was instrumental to the safety and success of this surgical case. X-rays confirmed excellent position of the interbody cage. Two screws secured this level and did not impinge upon the caudal plate and screws. For the superior level the patients chin would not allow trejectory into the C4 vert from above and a micro plate was needed. An appropriate Titanium cage with DBF biological was placed. A 19mm Medtronic Atlantis Elite anterior cervical locking plate was contoured to the cervical spine and locked into position with 4 locking screws in Cervical level 3-4. Xray's confirmed excellent position of the locking plate and stand alone device without abnormalities. Six screws were placed in total to secure this 2 level construct which was found to be highly stable. All bleeding was cauterized with bipolar electrocautery and hemostatic agents. A deep TLS drain was placed. The platysma was re approximated with 2-0 Vicryl suture. The skin edges were re approximated with 2-0 Vicryl in subcutaneous fashion, and the skin was closed with a running 3-0 Stratofix in subcuticular fashion. A layer of Dermabond skin sealant was placed, as well as a soft sterile dressing and a hard cervical collar. The patient recovered from anesthesia and was transferred to the postanesthesia care unit in stable condition.       Jaiden Jenkins MD  4/21/2022  4:46 PM

## 2022-04-21 NOTE — PERIOP NOTES
Reviewed PTA medication list with patient/caregiver and patient/caregiver denies any additional medications. Patient admits to having a responsible adult care for them at home for at least 24 hours after surgery. Patient encouraged to use gown warming system and informed that using said warming gown to regulate body temperature prior to a procedure has been shown to help reduce the risks of blood clots and infection. Patient's pharmacy of choice verified and documented in PTA medication section. Dual skin assessment & fall risk band verification completed with Negar THURMAN.

## 2022-04-21 NOTE — DISCHARGE INSTRUCTIONS
Dr. Simran Reeves Operative Instructions: Cervical Fusion    *YOU MUST AVOID SMOKING OR BEING AROUND ANYONE WHO SMOKES. AVOID ALL PRODUCTS THAT CONTAIN NICOTINE. DO NOT TAKE IBUPROFEN OR ANTI--INFLAMMATORIES, AS THESE MAY ALTER THE HEALING OF THE FUSION. Diet:   1. Begin with liquids and light foods such as jell-o or soups  2. Advance as tolerated to your regular diet if not nauseated. 3.  Swallowing difficulties may be experienced up to 2 weeks post-operatively. Modify food thickness as necessary. You may also obtain over-the-counter chloraseptic spray. Medications:  1. Strong oral narcotic pain medications have been prescribed for the first few days. Use only as directed. No pain medication is capable of taking away all the pain. Taking your pills at regular intervals will give you the best chance of having less pain. 2. If you need a refill PLEASE PLAN AHEAD. Call our office during regular hours (8-5). 3. Do not combine with alcoholic beverages. 4. Be careful as you walk, climb stairs or drive as mild dizziness is not unusual.  5. Do not take medications that have not been prescribed by your surgeon. 6. You may switch to over the counter pain medication of your choice as you become more comfortable. Activity and Restrictions:  1. You should not drive until you are clear by your surgeon at your post-operative visit. 2.  In regards to your cervical collar, you MUST wear this at all times until your first follow-up appointment. 3.  You should wear the collar even when sleeping. 4.  It can be removed for short periods of time as long as you are keeping your head centered over the shoulders without rotating or looking up or down. 5. You may take short walks inside and outside of your home and climb stairs. 6. You are to avoid work, housework, yard work, snow shoveling, lifting of more than a few pounds, overhead work or strenuous activity.   7. Avoid any excessive stretching or range-of-motion of the neck. Non-painful range-of-motion of the neck is allowed  8. Follow-up with Dr. Erma Milian in 7-10 days. DRIVIN. You should not drive until after your follow-up appointment. 2.  You can be in a vehicle for short distances, but if you travel any long distance, please stop about every 30 minutes and walk/stretch. 3.  You should NEVER drive while taking narcotic medication. BATHING and INCISION CARE:  1. The incision may be tender to touch or feel numb: this is normal.   2.  Keep the incision clean and dry no showering until your follow-up appointment. The incision will be closed with sutures under the skin and the skin will be glued. 3.  Do not apply any lotions, ointments or oils on the incision. 4.  If you notice any excessive swelling, redness, or persistent drainage around the incision, notify the office immediately. RETURN TO WORK :  1. The decision to return to work will be determined on an individual basis. 2.  Many people who have a strenuous job (construction, heavy labor, etc) may need to be off work for up to 8 weeks. 3.  If you need a work note, please let us know as soon as possible, and not the same day you are planning to return to work. NUTRITION :  1.  Good nutrition is an essential part of healing. 2.  You should eat a balanced diet each day, including fruits, vegetables, dairy products and protein. 3.  Remember to drink plenty of water. 4.  If you have not had a bowel movement within 3 days of surgery, you will need to use a laxative or suppository that can be obtained over-the-counter at your local pharmacy. BONE STIMULATOR:  1. A spinal bone stimulator may be prescribed for you under certain situations. 2.  A nurse will call you if one has been requested and discuss its use for approximately 4-6 months post-op every day. 3.  This device assists in bone healing and fusion.     CALL THE OFFICE:   If you have severe pain unrelieved by the medications, new numbness or tingling in your arms;   If you have a fever of 101.0°F or greater;    If you notice excessive swelling, redness, or persistent drainage from the incision or IV site; [facility] office number is [phone] from 8:00am to 5:00pm Monday through Friday. After 5:00pm, on weekends, or holidays, please leave a message with our answering service and the doctor on-call will get back to you shortly. Patient armband removed and shredded    DISCHARGE SUMMARY from Nurse    PATIENT INSTRUCTIONS:    After general anesthesia or intravenous sedation, for 24 hours or while taking prescription Narcotics:  · Limit your activities  · Do not drive and operate hazardous machinery  · Do not make important personal or business decisions  · Do  not drink alcoholic beverages  · If you have not urinated within 8 hours after discharge, please contact your surgeon on call. Report the following to your surgeon:  · Excessive pain, swelling, redness or odor of or around the surgical area  · Temperature over 100.5  · Nausea and vomiting lasting longer than 4 hours or if unable to take medications  · Any signs of decreased circulation or nerve impairment to extremity: change in color, persistent  numbness, tingling, coldness or increase pain  · Any questions    What to do at Home:  Recommended activity: Activity as tolerated and no driving for today, No driving while on analgesics and See surgical instructions    If you experience any of the following symptoms Fever, chills, pain uncontrolled by prescribed pain meds, odorous drainage, redness and or swelling at incision site  , please follow up with DR. TANG. *  Please give a list of your current medications to your Primary Care Provider. *  Please update this list whenever your medications are discontinued, doses are      changed, or new medications (including over-the-counter products) are added.     *  Please carry medication information at all times in case of emergency situations. These are general instructions for a healthy lifestyle:    No smoking/ No tobacco products/ Avoid exposure to second hand smoke  Surgeon General's Warning:  Quitting smoking now greatly reduces serious risk to your health. Obesity, smoking, and sedentary lifestyle greatly increases your risk for illness    A healthy diet, regular physical exercise & weight monitoring are important for maintaining a healthy lifestyle    You may be retaining fluid if you have a history of heart failure or if you experience any of the following symptoms:  Weight gain of 3 pounds or more overnight or 5 pounds in a week, increased swelling in our hands or feet or shortness of breath while lying flat in bed. Please call your doctor as soon as you notice any of these symptoms; do not wait until your next office visit. The discharge information has been reviewed with the patient and caregiver. The patient and caregiver verbalized understanding. Discharge medications reviewed with the patient and caregiver and appropriate educational materials and side effects teaching were provided.   ___________________________________________________________________________________________________________________________________

## 2022-04-21 NOTE — INTERVAL H&P NOTE
Update History & Physical    The Patient's History and Physical of April 21,   Chart was reviewed with the patient and I examined the patient. There was no change. The surgical site was confirmed by the patient and me. Plan:  The risk, benefits, expected outcome, and alternative to the recommended procedure have been discussed with the patient. Patient understands and wants to proceed with the procedure.     Electronically signed by Sofiya Schreiber MD on 4/21/2022 at 12:50 PM

## 2022-04-21 NOTE — ANESTHESIA POSTPROCEDURE EVALUATION
Post-Anesthesia Evaluation and Assessment    Cardiovascular Function/Vital Signs  Visit Vitals  BP (!) 155/67   Pulse 98   Temp 36.9 °C (98.5 °F)   Resp 21   Ht 4' 11\" (1.499 m)   Wt 68.9 kg (151 lb 14.4 oz)   SpO2 97%   BMI 30.68 kg/m²       Patient is status post Procedure(s):  ANTERIOR CERVICAL DISCECTOMY AND FUSION,CERVICAL 3-CERVICAL 5 W/C-ARM. Nausea/Vomiting: Controlled. Postoperative hydration reviewed and adequate. Pain:  Pain Scale 1: Numeric (0 - 10) (04/21/22 1746)  Pain Intensity 1: 0 (04/21/22 1746)   Managed. Neurological Status:   Neuro (WDL): Within Defined Limits (04/21/22 1746)   At baseline. Mental Status and Level of Consciousness: Baseline and stable. Pulmonary Status:   O2 Device: None (Room air) (04/21/22 1741)   Adequate oxygenation and airway patent. Complications related to anesthesia: None    Post-anesthesia assessment completed. No concerns. Patient has met all discharge requirements.     Signed By: Jose Wharton MD

## 2022-09-12 ENCOUNTER — TRANSCRIBE ORDER (OUTPATIENT)
Dept: SCHEDULING | Age: 77
End: 2022-09-12

## 2022-09-12 DIAGNOSIS — K21.9 GASTROESOPHAGEAL REFLUX DISEASE WITHOUT ESOPHAGITIS: Primary | ICD-10-CM

## 2022-09-20 ENCOUNTER — HOSPITAL ENCOUNTER (OUTPATIENT)
Dept: GENERAL RADIOLOGY | Age: 77
Discharge: HOME | End: 2022-09-20
Attending: INTERNAL MEDICINE
Payer: MEDICARE

## 2022-09-20 DIAGNOSIS — K21.9 GASTROESOPHAGEAL REFLUX DISEASE WITHOUT ESOPHAGITIS: ICD-10-CM

## 2022-09-20 PROCEDURE — 74220 X-RAY XM ESOPHAGUS 1CNTRST: CPT

## 2022-09-20 PROCEDURE — 74011000250 HC RX REV CODE- 250: Performed by: INTERNAL MEDICINE

## 2022-09-20 RX ADMIN — BARIUM SULFATE 90 ML: 980 POWDER, FOR SUSPENSION ORAL at 09:32

## 2022-09-20 RX ADMIN — ANTACID/ANTIFLATULENT 4 G: 380; 550; 10; 10 GRANULE, EFFERVESCENT ORAL at 09:33

## 2022-10-07 ENCOUNTER — TRANSCRIBE ORDER (OUTPATIENT)
Dept: SCHEDULING | Age: 77
End: 2022-10-07

## 2022-10-07 DIAGNOSIS — R06.02 SHORTNESS OF BREATH: ICD-10-CM

## 2022-10-07 DIAGNOSIS — R09.02 HYPOXEMIA: Primary | ICD-10-CM

## 2022-10-07 DIAGNOSIS — J84.9 ILD (INTERSTITIAL LUNG DISEASE) (HCC): Primary | ICD-10-CM

## 2022-11-04 ENCOUNTER — HOSPITAL ENCOUNTER (OUTPATIENT)
Dept: CT IMAGING | Age: 77
Discharge: HOME | End: 2022-11-04
Attending: INTERNAL MEDICINE
Payer: MEDICARE

## 2022-11-04 ENCOUNTER — HOSPITAL ENCOUNTER (OUTPATIENT)
Dept: NON INVASIVE DIAGNOSTICS | Age: 77
Discharge: HOME | End: 2022-11-04
Attending: INTERNAL MEDICINE
Payer: MEDICARE

## 2022-11-04 VITALS
HEIGHT: 59 IN | DIASTOLIC BLOOD PRESSURE: 85 MMHG | WEIGHT: 151 LBS | BODY MASS INDEX: 30.44 KG/M2 | SYSTOLIC BLOOD PRESSURE: 154 MMHG

## 2022-11-04 DIAGNOSIS — R06.02 SHORTNESS OF BREATH: ICD-10-CM

## 2022-11-04 DIAGNOSIS — R09.02 HYPOXEMIA: ICD-10-CM

## 2022-11-04 DIAGNOSIS — J84.9 ILD (INTERSTITIAL LUNG DISEASE) (HCC): ICD-10-CM

## 2022-11-04 LAB
ECHO AO ROOT DIAM: 3 CM
ECHO AO ROOT INDEX: 1.83 CM/M2
ECHO AR MAX VEL PISA: 1.8 M/S
ECHO AV REGURGITANT PHT: 1030.1 MILLISECOND
ECHO IVC PROX: 1.8 CM
ECHO LA DIAMETER INDEX: 1.65 CM/M2
ECHO LA DIAMETER: 2.7 CM
ECHO LA TO AORTIC ROOT RATIO: 0.9
ECHO LA VOL 4C: 19 ML (ref 22–52)
ECHO LA VOLUME INDEX A4C: 12 ML/M2 (ref 16–34)
ECHO LV E' LATERAL VELOCITY: 5 CM/S
ECHO LV E' SEPTAL VELOCITY: 6 CM/S
ECHO LV EDV A2C: 53 ML
ECHO LV EDV A4C: 43 ML
ECHO LV EDV BP: 49 ML (ref 56–104)
ECHO LV EDV INDEX A4C: 26 ML/M2
ECHO LV EDV INDEX BP: 30 ML/M2
ECHO LV EDV NDEX A2C: 32 ML/M2
ECHO LV EJECTION FRACTION A2C: 72 %
ECHO LV EJECTION FRACTION A4C: 63 %
ECHO LV EJECTION FRACTION BIPLANE: 68 % (ref 55–100)
ECHO LV ESV A2C: 15 ML
ECHO LV ESV A4C: 16 ML
ECHO LV ESV BP: 16 ML (ref 19–49)
ECHO LV ESV INDEX A2C: 9 ML/M2
ECHO LV ESV INDEX A4C: 10 ML/M2
ECHO LV ESV INDEX BP: 10 ML/M2
ECHO LV FRACTIONAL SHORTENING: 34 % (ref 28–44)
ECHO LV GLOBAL LONGITUDINAL STRAIN (GLS): -10.6 %
ECHO LV INTERNAL DIMENSION DIASTOLE INDEX: 2.68 CM/M2
ECHO LV INTERNAL DIMENSION DIASTOLIC: 4.4 CM (ref 3.9–5.3)
ECHO LV INTERNAL DIMENSION SYSTOLIC INDEX: 1.77 CM/M2
ECHO LV INTERNAL DIMENSION SYSTOLIC: 2.9 CM
ECHO LV IVSD: 0.9 CM (ref 0.6–0.9)
ECHO LV MASS 2D: 128 G (ref 67–162)
ECHO LV MASS INDEX 2D: 78.1 G/M2 (ref 43–95)
ECHO LV POSTERIOR WALL DIASTOLIC: 0.9 CM (ref 0.6–0.9)
ECHO LV RELATIVE WALL THICKNESS RATIO: 0.41
ECHO LVOT AREA: 3.1 CM2
ECHO LVOT DIAM: 2 CM
ECHO MV A VELOCITY: 1.08 M/S
ECHO MV E DECELERATION TIME (DT): 171.3 MS
ECHO MV E VELOCITY: 0.74 M/S
ECHO MV E/A RATIO: 0.69
ECHO MV E/E' LATERAL: 14.8
ECHO MV E/E' RATIO (AVERAGED): 13.57
ECHO MV E/E' SEPTAL: 12.33
ECHO RV FREE WALL PEAK S': 11 CM/S
ECHO RV INTERNAL DIMENSION: 2.1 CM
ECHO RV TAPSE: 1.7 CM (ref 1.7–?)

## 2022-11-04 PROCEDURE — 93306 TTE W/DOPPLER COMPLETE: CPT

## 2022-11-04 PROCEDURE — 71250 CT THORAX DX C-: CPT

## 2023-02-03 DIAGNOSIS — M47.22 CERVICAL SPONDYLOSIS WITH RADICULOPATHY: Primary | ICD-10-CM

## 2023-02-04 DIAGNOSIS — M47.22 CERVICAL SPONDYLOSIS WITH RADICULOPATHY: Primary | ICD-10-CM

## 2023-05-25 RX ORDER — CEPHALEXIN 500 MG/1
500 CAPSULE ORAL 4 TIMES DAILY
COMMUNITY
Start: 2022-04-21

## 2023-05-25 RX ORDER — LISINOPRIL 5 MG/1
TABLET ORAL DAILY
COMMUNITY

## 2023-08-11 ENCOUNTER — HOSPITAL ENCOUNTER (OUTPATIENT)
Facility: HOSPITAL | Age: 78
End: 2023-08-11
Payer: MEDICARE

## 2023-08-11 DIAGNOSIS — J84.9 INTERSTITIAL PULMONARY DISEASE, UNSPECIFIED (HCC): ICD-10-CM

## 2023-08-11 PROCEDURE — 71250 CT THORAX DX C-: CPT

## 2023-11-21 ENCOUNTER — TRANSCRIBE ORDERS (OUTPATIENT)
Facility: HOSPITAL | Age: 78
End: 2023-11-21

## 2023-11-21 DIAGNOSIS — J45.909 ASTHMATIC BRONCHITIS WITHOUT COMPLICATION, UNSPECIFIED ASTHMA SEVERITY, UNSPECIFIED WHETHER PERSISTENT: ICD-10-CM

## 2023-11-21 DIAGNOSIS — J98.4 PULMONARY DISEASE: ICD-10-CM

## 2023-11-27 ENCOUNTER — TRANSCRIBE ORDERS (OUTPATIENT)
Facility: HOSPITAL | Age: 78
End: 2023-11-27

## 2023-11-27 ENCOUNTER — HOSPITAL ENCOUNTER (OUTPATIENT)
Facility: HOSPITAL | Age: 78
Discharge: HOME | End: 2023-11-30
Payer: MEDICARE

## 2023-11-27 DIAGNOSIS — M16.11 OSTEOARTHRITIS OF RIGHT HIP, UNSPECIFIED OSTEOARTHRITIS TYPE: ICD-10-CM

## 2023-11-27 DIAGNOSIS — M16.11 OSTEOARTHRITIS OF RIGHT HIP, UNSPECIFIED OSTEOARTHRITIS TYPE: Primary | ICD-10-CM

## 2023-11-27 LAB
ALBUMIN SERPL-MCNC: 3.5 G/DL (ref 3.4–5)
ALBUMIN/GLOB SERPL: 1 (ref 0.8–1.7)
ALP SERPL-CCNC: 128 U/L (ref 45–117)
ALT SERPL-CCNC: 28 U/L (ref 13–56)
ANION GAP SERPL CALC-SCNC: 6 MMOL/L (ref 3–18)
APPEARANCE UR: CLEAR
APTT PPP: 28.1 SEC (ref 23–36.4)
AST SERPL-CCNC: 32 U/L (ref 10–38)
BACTERIA URNS QL MICRO: ABNORMAL /HPF
BILIRUB SERPL-MCNC: 0.2 MG/DL (ref 0.2–1)
BILIRUB UR QL: NEGATIVE
BUN SERPL-MCNC: 24 MG/DL (ref 7–18)
BUN/CREAT SERPL: 23 (ref 12–20)
CALCIUM SERPL-MCNC: 9.2 MG/DL (ref 8.5–10.1)
CHLORIDE SERPL-SCNC: 110 MMOL/L (ref 100–111)
CO2 SERPL-SCNC: 28 MMOL/L (ref 21–32)
COLOR UR: YELLOW
CREAT SERPL-MCNC: 1.06 MG/DL (ref 0.6–1.3)
EKG ATRIAL RATE: 90 BPM
EKG DIAGNOSIS: NORMAL
EKG P AXIS: 79 DEGREES
EKG P-R INTERVAL: 148 MS
EKG Q-T INTERVAL: 398 MS
EKG QRS DURATION: 126 MS
EKG QTC CALCULATION (BAZETT): 486 MS
EKG R AXIS: 93 DEGREES
EKG T AXIS: 76 DEGREES
EKG VENTRICULAR RATE: 90 BPM
EPITH CASTS URNS QL MICRO: ABNORMAL /LPF (ref 0–5)
ERYTHROCYTE [DISTWIDTH] IN BLOOD BY AUTOMATED COUNT: 13.6 % (ref 11.6–14.5)
ERYTHROCYTE [SEDIMENTATION RATE] IN BLOOD: 56 MM/HR (ref 0–30)
GLOBULIN SER CALC-MCNC: 3.6 G/DL (ref 2–4)
GLUCOSE SERPL-MCNC: 101 MG/DL (ref 74–99)
GLUCOSE UR STRIP.AUTO-MCNC: NEGATIVE MG/DL
HCT VFR BLD AUTO: 37.7 % (ref 35–45)
HGB BLD-MCNC: 11.5 G/DL (ref 12–16)
HGB UR QL STRIP: NEGATIVE
INR PPP: 0.9 (ref 0.9–1.1)
KETONES UR QL STRIP.AUTO: NEGATIVE MG/DL
LEUKOCYTE ESTERASE UR QL STRIP.AUTO: ABNORMAL
MCH RBC QN AUTO: 27 PG (ref 24–34)
MCHC RBC AUTO-ENTMCNC: 30.5 G/DL (ref 31–37)
MCV RBC AUTO: 88.5 FL (ref 78–100)
NITRITE UR QL STRIP.AUTO: NEGATIVE
NRBC # BLD: 0 K/UL (ref 0–0.01)
NRBC BLD-RTO: 0 PER 100 WBC
PH UR STRIP: 5 (ref 5–8)
PLATELET # BLD AUTO: 286 K/UL (ref 135–420)
PMV BLD AUTO: 9.6 FL (ref 9.2–11.8)
POTASSIUM SERPL-SCNC: 4.4 MMOL/L (ref 3.5–5.5)
PROT SERPL-MCNC: 7.1 G/DL (ref 6.4–8.2)
PROT UR STRIP-MCNC: NEGATIVE MG/DL
PROTHROMBIN TIME: 12.5 SEC (ref 11.9–14.7)
RBC # BLD AUTO: 4.26 M/UL (ref 4.2–5.3)
RBC #/AREA URNS HPF: NEGATIVE /HPF (ref 0–5)
SODIUM SERPL-SCNC: 144 MMOL/L (ref 136–145)
SP GR UR REFRACTOMETRY: 1.02 (ref 1–1.03)
UROBILINOGEN UR QL STRIP.AUTO: 0.2 EU/DL (ref 0.2–1)
WBC # BLD AUTO: 8 K/UL (ref 4.6–13.2)
WBC URNS QL MICRO: ABNORMAL /HPF (ref 0–5)

## 2023-11-27 PROCEDURE — 85652 RBC SED RATE AUTOMATED: CPT

## 2023-11-27 PROCEDURE — 93005 ELECTROCARDIOGRAM TRACING: CPT

## 2023-11-27 PROCEDURE — 85027 COMPLETE CBC AUTOMATED: CPT

## 2023-11-27 PROCEDURE — 85730 THROMBOPLASTIN TIME PARTIAL: CPT

## 2023-11-27 PROCEDURE — 80053 COMPREHEN METABOLIC PANEL: CPT

## 2023-11-27 PROCEDURE — 36415 COLL VENOUS BLD VENIPUNCTURE: CPT

## 2023-11-27 PROCEDURE — 85610 PROTHROMBIN TIME: CPT

## 2023-11-27 PROCEDURE — 81001 URINALYSIS AUTO W/SCOPE: CPT

## 2023-11-27 PROCEDURE — 87086 URINE CULTURE/COLONY COUNT: CPT

## 2023-11-28 LAB
BACTERIA SPEC CULT: NORMAL
SERVICE CMNT-IMP: NORMAL
SERVICE CMNT-IMP: NORMAL

## 2023-12-05 ENCOUNTER — HOSPITAL ENCOUNTER (OUTPATIENT)
Facility: HOSPITAL | Age: 78
Discharge: HOME | End: 2023-12-08
Attending: INTERNAL MEDICINE
Payer: MEDICARE

## 2023-12-05 DIAGNOSIS — J98.4 PULMONARY DISEASE: ICD-10-CM

## 2023-12-05 PROCEDURE — 71250 CT THORAX DX C-: CPT

## 2023-12-12 PROBLEM — M16.11 OSTEOARTHRITIS OF RIGHT HIP: Status: ACTIVE | Noted: 2023-12-12

## 2024-02-08 ENCOUNTER — HOSPITAL ENCOUNTER (OUTPATIENT)
Facility: HOSPITAL | Age: 79
Discharge: HOME | End: 2024-02-08
Payer: MEDICARE

## 2024-02-08 ENCOUNTER — TRANSCRIBE ORDERS (OUTPATIENT)
Facility: HOSPITAL | Age: 79
End: 2024-02-08

## 2024-02-08 DIAGNOSIS — G62.9 POLYNEUROPATHY: ICD-10-CM

## 2024-02-08 DIAGNOSIS — G62.9 POLYNEUROPATHY: Primary | ICD-10-CM

## 2024-02-08 LAB
ANION GAP SERPL CALC-SCNC: 5 MMOL/L (ref 3–18)
BASOPHILS # BLD: 0 K/UL (ref 0–0.1)
BASOPHILS NFR BLD: 0 % (ref 0–2)
BUN SERPL-MCNC: 21 MG/DL (ref 7–18)
BUN/CREAT SERPL: 19 (ref 12–20)
CALCIUM SERPL-MCNC: 9.5 MG/DL (ref 8.5–10.1)
CHLORIDE SERPL-SCNC: 108 MMOL/L (ref 100–111)
CO2 SERPL-SCNC: 28 MMOL/L (ref 21–32)
CREAT SERPL-MCNC: 1.09 MG/DL (ref 0.6–1.3)
DIFFERENTIAL METHOD BLD: ABNORMAL
EOSINOPHIL # BLD: 0.1 K/UL (ref 0–0.4)
EOSINOPHIL NFR BLD: 1 % (ref 0–5)
ERYTHROCYTE [DISTWIDTH] IN BLOOD BY AUTOMATED COUNT: 14.6 % (ref 11.6–14.5)
GLUCOSE SERPL-MCNC: 106 MG/DL (ref 74–99)
HCT VFR BLD AUTO: 37.2 % (ref 35–45)
HGB BLD-MCNC: 11.5 G/DL (ref 12–16)
IMM GRANULOCYTES # BLD AUTO: 0.1 K/UL (ref 0–0.04)
IMM GRANULOCYTES NFR BLD AUTO: 1 % (ref 0–0.5)
LYMPHOCYTES # BLD: 1.6 K/UL (ref 0.9–3.6)
LYMPHOCYTES NFR BLD: 18 % (ref 21–52)
MCH RBC QN AUTO: 26.3 PG (ref 24–34)
MCHC RBC AUTO-ENTMCNC: 30.9 G/DL (ref 31–37)
MCV RBC AUTO: 84.9 FL (ref 78–100)
MONOCYTES # BLD: 0.6 K/UL (ref 0.05–1.2)
MONOCYTES NFR BLD: 7 % (ref 3–10)
NEUTS SEG # BLD: 6.6 K/UL (ref 1.8–8)
NEUTS SEG NFR BLD: 74 % (ref 40–73)
NRBC # BLD: 0 K/UL (ref 0–0.01)
NRBC BLD-RTO: 0 PER 100 WBC
PLATELET # BLD AUTO: 393 K/UL (ref 135–420)
PMV BLD AUTO: 9.2 FL (ref 9.2–11.8)
POTASSIUM SERPL-SCNC: 4.5 MMOL/L (ref 3.5–5.5)
RBC # BLD AUTO: 4.38 M/UL (ref 4.2–5.3)
SODIUM SERPL-SCNC: 141 MMOL/L (ref 136–145)
TSH SERPL DL<=0.05 MIU/L-ACNC: 2.55 UIU/ML (ref 0.36–3.74)
WBC # BLD AUTO: 8.9 K/UL (ref 4.6–13.2)

## 2024-02-08 PROCEDURE — 83036 HEMOGLOBIN GLYCOSYLATED A1C: CPT

## 2024-02-08 PROCEDURE — 86334 IMMUNOFIX E-PHORESIS SERUM: CPT

## 2024-02-08 PROCEDURE — 85025 COMPLETE CBC W/AUTO DIFF WBC: CPT

## 2024-02-08 PROCEDURE — 82784 ASSAY IGA/IGD/IGG/IGM EACH: CPT

## 2024-02-08 PROCEDURE — 36415 COLL VENOUS BLD VENIPUNCTURE: CPT

## 2024-02-08 PROCEDURE — 80048 BASIC METABOLIC PNL TOTAL CA: CPT

## 2024-02-08 PROCEDURE — 82607 VITAMIN B-12: CPT

## 2024-02-08 PROCEDURE — 84443 ASSAY THYROID STIM HORMONE: CPT

## 2024-02-09 LAB
EST. AVERAGE GLUCOSE BLD GHB EST-MCNC: 114 MG/DL
HBA1C MFR BLD: 5.6 % (ref 4.2–5.6)
VIT B12 SERPL-MCNC: 1211 PG/ML (ref 211–911)

## 2024-02-10 LAB
IGA SERPL-MCNC: 208 MG/DL (ref 64–422)
IGG SERPL-MCNC: 1034 MG/DL (ref 586–1602)
IGM SERPL-MCNC: 60 MG/DL (ref 26–217)
PROT PATTERN SERPL IFE-IMP: NORMAL

## 2024-02-19 LAB
FAX TO NUMBER: NORMAL
TEST RESULTS FAXED TO: NORMAL

## 2024-12-03 ENCOUNTER — TRANSCRIBE ORDERS (OUTPATIENT)
Facility: HOSPITAL | Age: 79
End: 2024-12-03

## 2024-12-03 ENCOUNTER — HOSPITAL ENCOUNTER (OUTPATIENT)
Facility: HOSPITAL | Age: 79
Discharge: HOME | End: 2024-12-06
Payer: MEDICARE

## 2024-12-03 DIAGNOSIS — M19.012 ARTHRITIS OF LEFT SHOULDER REGION: Primary | ICD-10-CM

## 2024-12-03 DIAGNOSIS — M19.012 ARTHRITIS OF LEFT SHOULDER REGION: ICD-10-CM

## 2024-12-03 LAB
ALBUMIN SERPL-MCNC: 3.7 G/DL (ref 3.4–5)
ALBUMIN/GLOB SERPL: 1 (ref 0.8–1.7)
ALP SERPL-CCNC: 150 U/L (ref 45–117)
ALT SERPL-CCNC: 29 U/L (ref 13–56)
ANION GAP SERPL CALC-SCNC: 5 MMOL/L (ref 3–18)
AST SERPL-CCNC: 38 U/L (ref 10–38)
BASOPHILS # BLD: 0.1 K/UL (ref 0–0.1)
BASOPHILS NFR BLD: 1 % (ref 0–2)
BILIRUB SERPL-MCNC: 0.4 MG/DL (ref 0.2–1)
BUN SERPL-MCNC: 23 MG/DL (ref 7–18)
BUN/CREAT SERPL: 21 (ref 12–20)
CALCIUM SERPL-MCNC: 9.1 MG/DL (ref 8.5–10.1)
CHLORIDE SERPL-SCNC: 105 MMOL/L (ref 100–111)
CO2 SERPL-SCNC: 29 MMOL/L (ref 21–32)
CREAT SERPL-MCNC: 1.11 MG/DL (ref 0.6–1.3)
DIFFERENTIAL METHOD BLD: ABNORMAL
EOSINOPHIL # BLD: 0.2 K/UL (ref 0–0.4)
EOSINOPHIL NFR BLD: 2 % (ref 0–5)
ERYTHROCYTE [DISTWIDTH] IN BLOOD BY AUTOMATED COUNT: 13.5 % (ref 11.6–14.5)
GLOBULIN SER CALC-MCNC: 3.8 G/DL (ref 2–4)
GLUCOSE SERPL-MCNC: 105 MG/DL (ref 74–99)
HCT VFR BLD AUTO: 41.2 % (ref 35–45)
HGB BLD-MCNC: 13 G/DL (ref 12–16)
IMM GRANULOCYTES # BLD AUTO: 0.1 K/UL (ref 0–0.04)
IMM GRANULOCYTES NFR BLD AUTO: 1 % (ref 0–0.5)
LYMPHOCYTES # BLD: 1.6 K/UL (ref 0.9–3.6)
LYMPHOCYTES NFR BLD: 17 % (ref 21–52)
MCH RBC QN AUTO: 27.1 PG (ref 24–34)
MCHC RBC AUTO-ENTMCNC: 31.6 G/DL (ref 31–37)
MCV RBC AUTO: 85.8 FL (ref 78–100)
MONOCYTES # BLD: 0.7 K/UL (ref 0.05–1.2)
MONOCYTES NFR BLD: 7 % (ref 3–10)
NEUTS SEG # BLD: 6.8 K/UL (ref 1.8–8)
NEUTS SEG NFR BLD: 72 % (ref 40–73)
NRBC # BLD: 0 K/UL (ref 0–0.01)
NRBC BLD-RTO: 0 PER 100 WBC
PLATELET # BLD AUTO: 351 K/UL (ref 135–420)
PMV BLD AUTO: 8.9 FL (ref 9.2–11.8)
POTASSIUM SERPL-SCNC: 3.8 MMOL/L (ref 3.5–5.5)
PROT SERPL-MCNC: 7.5 G/DL (ref 6.4–8.2)
RBC # BLD AUTO: 4.8 M/UL (ref 4.2–5.3)
SODIUM SERPL-SCNC: 139 MMOL/L (ref 136–145)
WBC # BLD AUTO: 9.5 K/UL (ref 4.6–13.2)

## 2024-12-03 PROCEDURE — 87086 URINE CULTURE/COLONY COUNT: CPT

## 2024-12-03 PROCEDURE — 93005 ELECTROCARDIOGRAM TRACING: CPT

## 2024-12-03 PROCEDURE — 85025 COMPLETE CBC W/AUTO DIFF WBC: CPT

## 2024-12-03 PROCEDURE — 80053 COMPREHEN METABOLIC PANEL: CPT

## 2024-12-03 PROCEDURE — 36415 COLL VENOUS BLD VENIPUNCTURE: CPT

## 2024-12-04 LAB
BACTERIA SPEC CULT: ABNORMAL
BACTERIA SPEC CULT: ABNORMAL
BACTERIA SPEC CULT: NORMAL
EKG ATRIAL RATE: 82 BPM
EKG DIAGNOSIS: NORMAL
EKG P AXIS: 78 DEGREES
EKG P-R INTERVAL: 144 MS
EKG Q-T INTERVAL: 430 MS
EKG QRS DURATION: 130 MS
EKG QTC CALCULATION (BAZETT): 502 MS
EKG R AXIS: 95 DEGREES
EKG T AXIS: 59 DEGREES
EKG VENTRICULAR RATE: 82 BPM
SERVICE CMNT-IMP: ABNORMAL
SERVICE CMNT-IMP: NORMAL

## 2024-12-09 LAB
FAX TO NUMBER: NORMAL
TEST RESULTS FAXED TO: NORMAL

## 2024-12-17 ENCOUNTER — ANESTHESIA EVENT (OUTPATIENT)
Facility: HOSPITAL | Age: 79
End: 2024-12-17
Payer: MEDICARE

## 2024-12-17 RX ORDER — FERROUS SULFATE 325(65) MG
325 TABLET ORAL EVERY OTHER DAY
COMMUNITY

## 2024-12-17 RX ORDER — SERTRALINE HYDROCHLORIDE 25 MG/1
50 TABLET, FILM COATED ORAL
COMMUNITY

## 2024-12-17 ASSESSMENT — PAIN - FUNCTIONAL ASSESSMENT: PAIN_FUNCTIONAL_ASSESSMENT: 0-10

## 2024-12-17 NOTE — PERIOP NOTE
PCP is aware of the surgery. Instructed to bring Insurance card and ID on the day of surgery.  Advance directives status: none       SURGICAL PRE-ADMISSION INSTRUCTIONS    Your Medication instructions:    Bring Trelegy inhaler on day of surgery and use if needed.    Take or Use these medications the morning of surgery with a sip of water: Omeprazole    Do Not Take morning of surgery: Lisinopril    Follow surgeon's instructions for stopping NSAIDs (i.e. Ibuprofen/Motrin, Advil, BC powder, Aleve/Naproxen, Voltaren cream or tablet, Mobic/Meloxicam, Excedrin, Celebrex, Ketorolac/Toradol, etc). If no instructions provided stop NSAIDs 7 days prior to surgery.    Stop supplements and vitamins (except multivitamins-stop the night prior to surgery) 2 weeks prior to surgery.    Patient denies being on any eye drops, ointment/lotions/gels, patches, injectable medications, phentermine at this time. Instructed patient to please notify Preanesthesia Testing at [phone] with any medication changes after the PAT phone appointment.    General Day Surgery Instructions    Do NOT eat or drink anything, including water, ice chips, soda, candy, or gum after MIDNIGHT on the night before surgery, unless you have specific instructions from your Surgeon or Anesthesia Provider to do so.   Please follow instructions for cleaning skin with CHG wash kit 3 days prior to surgery as directed.    Please shower with antibacterial bar soap prior to arrival on day of surgery.  No smoking/vaping/chewing tobacco products 24 hours before surgery.  No recreational drugs for one week prior to the day of surgery.  Remove all jewelry, nail polish, makeup (including mascara); no lotions, powders, deodorant, or perfume/cologne/after shave.  Leave all valuables, including money/purse/wallet, jewelry, laptop, etc.. at home.  Bring durable medical equipment (DME) needed: sling  Glasses/Contact lenses, Hearing aids, and Dentures may be worn to the hospital.

## 2024-12-17 NOTE — PERIOP NOTE
Spoke with Kat at [facility]. Pt +MRSA. Does he want Vancomycin? Posting- Modesta notified and pt is aware.

## 2024-12-18 NOTE — PERIOP NOTE
Pre-Admission Testing Algorithm for Identification of Special Population for                                                       Anesthesia Review                                                            Patient History Triggers That Require Anesthesia Review & Potential Clearance      []Anesthesia Reaction History: Malignant Hyperthermia (including family hx of MH) and Pseudocholinesterase Deficiency  []Arrhythmias (Significant) or Conduction abnormalities (e.g., A-Fib, A-Flutter, SVT, PSVT, VT, V-Fib, Bradycardia with HR < 40, Sick Sinus Syndrome, Bi/Trigeminy, PACs/PVCs and accessory pathway tachycardias such as Ordaz-Parkinson-White)  []Cardiac History: CHF, CAD, Heart Valve Disorders (Aortic Stenosis, Mitral Regurgitation and Atresia), Cardiomyopathy, Aneurysms, All Ischemia, LBBB, Septal Defects, New/Recent Infarcts and Angina, and Pacemakers/AICDs-  [x]Pulmonary History: Severe COPD: Emphysema/Chronic Bronchitis (recent exacerbation within 6 weeks of surgery), Pulmonary Fibrosis, Pulmonary HTN, and on Home Oxygen  []Kidney History: Chronic Renal Failure, on Hemodialysis or Peritoneal Dialysis  []Hepatic History: Liver Failure, Cirrhosis, Ascites  []HgA1c > 8.0 or on insulin pump  []Hgb < 10  []CVA/Stroke History/Tia  []Seizure/Epilepsy History  []PVD/PAD  []Head and Neck Cancers History: Chemotherapy/Radiation Treatment  []Bleeding and Clotting Disorders: Hemophilia, Von Willebrand (DDAVP, Factor V Leiden, Factor Deficiencies Disorders, Thrombocytopenia, DIC, Sickle Cell Disease, Hereditary Hemorrhagic Telangiectasia (HHT) and Prothrombin Gene Mutation  []Rare Diseases: Cystic Fibrosis, Muscular Dystrophies, Phenylketonuria (PKU), Achalasia, etc…  []Autoimmune Diseases(to include the following): Type-1 Diabetes, RA, Lupus, Multiple Sclerosis, Columbia's and Graves's Diseases, Hashimoto's Thyroiditis, Myasthenia Gravis, Scleroderma, Sjogren's Syndrome, Pernicious Anemia, Autoimmune

## 2024-12-20 ENCOUNTER — APPOINTMENT (OUTPATIENT)
Facility: HOSPITAL | Age: 79
End: 2024-12-20
Attending: ORTHOPAEDIC SURGERY
Payer: MEDICARE

## 2024-12-20 ENCOUNTER — HOSPITAL ENCOUNTER (OUTPATIENT)
Facility: HOSPITAL | Age: 79
Setting detail: OUTPATIENT SURGERY
Discharge: HOME | End: 2024-12-20
Attending: ORTHOPAEDIC SURGERY | Admitting: ORTHOPAEDIC SURGERY
Payer: MEDICARE

## 2024-12-20 ENCOUNTER — ANESTHESIA (OUTPATIENT)
Facility: HOSPITAL | Age: 79
End: 2024-12-20
Payer: MEDICARE

## 2024-12-20 VITALS
SYSTOLIC BLOOD PRESSURE: 100 MMHG | BODY MASS INDEX: 30.04 KG/M2 | HEART RATE: 84 BPM | WEIGHT: 149 LBS | TEMPERATURE: 97 F | HEIGHT: 59 IN | DIASTOLIC BLOOD PRESSURE: 62 MMHG | RESPIRATION RATE: 16 BRPM | OXYGEN SATURATION: 97 %

## 2024-12-20 DIAGNOSIS — M19.012 PRIMARY OSTEOARTHRITIS OF LEFT SHOULDER: Primary | Chronic | ICD-10-CM

## 2024-12-20 PROCEDURE — C1776 JOINT DEVICE (IMPLANTABLE): HCPCS | Performed by: ORTHOPAEDIC SURGERY

## 2024-12-20 PROCEDURE — 6370000000 HC RX 637 (ALT 250 FOR IP): Performed by: ORTHOPAEDIC SURGERY

## 2024-12-20 PROCEDURE — 7100000010 HC PHASE II RECOVERY - FIRST 15 MIN: Performed by: ORTHOPAEDIC SURGERY

## 2024-12-20 PROCEDURE — 3700000001 HC ADD 15 MINUTES (ANESTHESIA): Performed by: ORTHOPAEDIC SURGERY

## 2024-12-20 PROCEDURE — 2500000003 HC RX 250 WO HCPCS: Performed by: ORTHOPAEDIC SURGERY

## 2024-12-20 PROCEDURE — 7100000001 HC PACU RECOVERY - ADDTL 15 MIN: Performed by: ORTHOPAEDIC SURGERY

## 2024-12-20 PROCEDURE — 3600000012 HC SURGERY LEVEL 2 ADDTL 15MIN: Performed by: ORTHOPAEDIC SURGERY

## 2024-12-20 PROCEDURE — 7100000011 HC PHASE II RECOVERY - ADDTL 15 MIN: Performed by: ORTHOPAEDIC SURGERY

## 2024-12-20 PROCEDURE — 6360000002 HC RX W HCPCS: Performed by: ORTHOPAEDIC SURGERY

## 2024-12-20 PROCEDURE — 3600000002 HC SURGERY LEVEL 2 BASE: Performed by: ORTHOPAEDIC SURGERY

## 2024-12-20 PROCEDURE — 6360000002 HC RX W HCPCS: Performed by: NURSE ANESTHETIST, CERTIFIED REGISTERED

## 2024-12-20 PROCEDURE — 2580000003 HC RX 258: Performed by: NURSE ANESTHETIST, CERTIFIED REGISTERED

## 2024-12-20 PROCEDURE — 64415 NJX AA&/STRD BRCH PLXS IMG: CPT | Performed by: SPECIALIST

## 2024-12-20 PROCEDURE — 6360000002 HC RX W HCPCS: Performed by: SPECIALIST

## 2024-12-20 PROCEDURE — 7100000000 HC PACU RECOVERY - FIRST 15 MIN: Performed by: ORTHOPAEDIC SURGERY

## 2024-12-20 PROCEDURE — 73020 X-RAY EXAM OF SHOULDER: CPT

## 2024-12-20 PROCEDURE — 2500000003 HC RX 250 WO HCPCS: Performed by: SPECIALIST

## 2024-12-20 PROCEDURE — 2709999900 HC NON-CHARGEABLE SUPPLY: Performed by: ORTHOPAEDIC SURGERY

## 2024-12-20 PROCEDURE — 6370000000 HC RX 637 (ALT 250 FOR IP): Performed by: SPECIALIST

## 2024-12-20 PROCEDURE — 2500000003 HC RX 250 WO HCPCS: Performed by: NURSE ANESTHETIST, CERTIFIED REGISTERED

## 2024-12-20 PROCEDURE — 3700000000 HC ANESTHESIA ATTENDED CARE: Performed by: ORTHOPAEDIC SURGERY

## 2024-12-20 PROCEDURE — 2580000003 HC RX 258: Performed by: ORTHOPAEDIC SURGERY

## 2024-12-20 DEVICE — IMPLANTABLE DEVICE
Type: IMPLANTABLE DEVICE | Site: SHOULDER | Status: FUNCTIONAL
Brand: TORNIER PERFORM® REVERSED GLENOID

## 2024-12-20 DEVICE — SCREW BONE L30MM DIA6.5MM TI ST FULL THRD CTRL FOR GLEN: Type: IMPLANTABLE DEVICE | Site: SHOULDER | Status: FUNCTIONAL

## 2024-12-20 DEVICE — IMPLANTABLE DEVICE
Type: IMPLANTABLE DEVICE | Site: SHOULDER | Status: FUNCTIONAL
Brand: TORNIER FLEX SHOULDER SYSTEM

## 2024-12-20 DEVICE — SCREW BNE PERIPH 5X38 MM SHLDR REVERSED NS AEQUALIS PERFORM: Type: IMPLANTABLE DEVICE | Site: SHOULDER | Status: FUNCTIONAL

## 2024-12-20 DEVICE — SCREW BONE L30MM DIA5MM TI ST FULL THRD PERIPH FOR GLEN: Type: IMPLANTABLE DEVICE | Site: SHOULDER | Status: FUNCTIONAL

## 2024-12-20 RX ORDER — SODIUM CHLORIDE 9 MG/ML
INJECTION, SOLUTION INTRAVENOUS PRN
Status: DISCONTINUED | OUTPATIENT
Start: 2024-12-20 | End: 2024-12-20 | Stop reason: HOSPADM

## 2024-12-20 RX ORDER — ACETAMINOPHEN 325 MG/1
650 TABLET ORAL ONCE
Status: COMPLETED | OUTPATIENT
Start: 2024-12-20 | End: 2024-12-20

## 2024-12-20 RX ORDER — MIDAZOLAM HYDROCHLORIDE 2 MG/2ML
2 INJECTION, SOLUTION INTRAMUSCULAR; INTRAVENOUS
Status: DISCONTINUED | OUTPATIENT
Start: 2024-12-20 | End: 2024-12-20 | Stop reason: HOSPADM

## 2024-12-20 RX ORDER — HYDROMORPHONE HYDROCHLORIDE 1 MG/ML
0.5 INJECTION, SOLUTION INTRAMUSCULAR; INTRAVENOUS; SUBCUTANEOUS EVERY 5 MIN PRN
Status: DISCONTINUED | OUTPATIENT
Start: 2024-12-20 | End: 2024-12-20 | Stop reason: HOSPADM

## 2024-12-20 RX ORDER — SODIUM CHLORIDE 0.9 % (FLUSH) 0.9 %
5-40 SYRINGE (ML) INJECTION EVERY 12 HOURS SCHEDULED
Status: DISCONTINUED | OUTPATIENT
Start: 2024-12-20 | End: 2024-12-20 | Stop reason: HOSPADM

## 2024-12-20 RX ORDER — HYDROMORPHONE HYDROCHLORIDE 2 MG/1
1 TABLET ORAL EVERY 4 HOURS PRN
Status: CANCELLED | OUTPATIENT
Start: 2024-12-20

## 2024-12-20 RX ORDER — SODIUM CHLORIDE 9 MG/ML
INJECTION, SOLUTION INTRAVENOUS PRN
Status: CANCELLED | OUTPATIENT
Start: 2024-12-20

## 2024-12-20 RX ORDER — ONDANSETRON 2 MG/ML
4 INJECTION INTRAMUSCULAR; INTRAVENOUS
Status: DISCONTINUED | OUTPATIENT
Start: 2024-12-20 | End: 2024-12-20 | Stop reason: HOSPADM

## 2024-12-20 RX ORDER — NALOXONE HYDROCHLORIDE 0.4 MG/ML
INJECTION, SOLUTION INTRAMUSCULAR; INTRAVENOUS; SUBCUTANEOUS PRN
Status: DISCONTINUED | OUTPATIENT
Start: 2024-12-20 | End: 2024-12-20 | Stop reason: HOSPADM

## 2024-12-20 RX ORDER — HYDROMORPHONE HYDROCHLORIDE 2 MG/1
2 TABLET ORAL EVERY 4 HOURS PRN
Qty: 30 TABLET | Refills: 0
Start: 2024-12-20 | End: 2024-12-27

## 2024-12-20 RX ORDER — ACETAMINOPHEN 325 MG/1
650 TABLET ORAL EVERY 6 HOURS
Status: CANCELLED | OUTPATIENT
Start: 2024-12-20

## 2024-12-20 RX ORDER — MIDAZOLAM HYDROCHLORIDE 1 MG/ML
INJECTION, SOLUTION INTRAMUSCULAR; INTRAVENOUS
Status: DISCONTINUED | OUTPATIENT
Start: 2024-12-20 | End: 2024-12-20 | Stop reason: SDUPTHER

## 2024-12-20 RX ORDER — POLYETHYLENE GLYCOL 3350 17 G/17G
17 POWDER, FOR SOLUTION ORAL DAILY PRN
Status: DISCONTINUED | OUTPATIENT
Start: 2024-12-20 | End: 2024-12-20 | Stop reason: HOSPADM

## 2024-12-20 RX ORDER — EPHEDRINE SULFATE 5 MG/ML
INJECTION INTRAVENOUS
Status: DISCONTINUED | OUTPATIENT
Start: 2024-12-20 | End: 2024-12-20 | Stop reason: SDUPTHER

## 2024-12-20 RX ORDER — ROCURONIUM BROMIDE 10 MG/ML
INJECTION, SOLUTION INTRAVENOUS
Status: DISCONTINUED | OUTPATIENT
Start: 2024-12-20 | End: 2024-12-20 | Stop reason: SDUPTHER

## 2024-12-20 RX ORDER — SODIUM CHLORIDE, SODIUM LACTATE, POTASSIUM CHLORIDE, CALCIUM CHLORIDE 600; 310; 30; 20 MG/100ML; MG/100ML; MG/100ML; MG/100ML
INJECTION, SOLUTION INTRAVENOUS CONTINUOUS
Status: DISCONTINUED | OUTPATIENT
Start: 2024-12-20 | End: 2024-12-20 | Stop reason: HOSPADM

## 2024-12-20 RX ORDER — PREGABALIN 50 MG/1
50 CAPSULE ORAL ONCE
Status: COMPLETED | OUTPATIENT
Start: 2024-12-20 | End: 2024-12-20

## 2024-12-20 RX ORDER — MIDAZOLAM HYDROCHLORIDE 1 MG/ML
INJECTION, SOLUTION INTRAMUSCULAR; INTRAVENOUS
Status: COMPLETED
Start: 2024-12-20 | End: 2024-12-20

## 2024-12-20 RX ORDER — ONDANSETRON 4 MG/1
4 TABLET, ORALLY DISINTEGRATING ORAL EVERY 8 HOURS PRN
Status: CANCELLED | OUTPATIENT
Start: 2024-12-20

## 2024-12-20 RX ORDER — FENTANYL CITRATE 50 UG/ML
INJECTION, SOLUTION INTRAMUSCULAR; INTRAVENOUS
Status: DISCONTINUED | OUTPATIENT
Start: 2024-12-20 | End: 2024-12-20 | Stop reason: SDUPTHER

## 2024-12-20 RX ORDER — DEXAMETHASONE SODIUM PHOSPHATE 4 MG/ML
INJECTION, SOLUTION INTRA-ARTICULAR; INTRALESIONAL; INTRAMUSCULAR; INTRAVENOUS; SOFT TISSUE
Status: DISCONTINUED | OUTPATIENT
Start: 2024-12-20 | End: 2024-12-20 | Stop reason: SDUPTHER

## 2024-12-20 RX ORDER — SODIUM CHLORIDE 0.9 % (FLUSH) 0.9 %
5-40 SYRINGE (ML) INJECTION PRN
Status: DISCONTINUED | OUTPATIENT
Start: 2024-12-20 | End: 2024-12-20 | Stop reason: HOSPADM

## 2024-12-20 RX ORDER — LABETALOL HYDROCHLORIDE 5 MG/ML
5 INJECTION, SOLUTION INTRAVENOUS
Status: DISCONTINUED | OUTPATIENT
Start: 2024-12-20 | End: 2024-12-20 | Stop reason: HOSPADM

## 2024-12-20 RX ORDER — PHENYLEPHRINE HCL IN 0.9% NACL 1 MG/10 ML
SYRINGE (ML) INTRAVENOUS
Status: DISCONTINUED | OUTPATIENT
Start: 2024-12-20 | End: 2024-12-20 | Stop reason: SDUPTHER

## 2024-12-20 RX ORDER — PROPOFOL 10 MG/ML
INJECTION, EMULSION INTRAVENOUS
Status: DISCONTINUED | OUTPATIENT
Start: 2024-12-20 | End: 2024-12-20 | Stop reason: SDUPTHER

## 2024-12-20 RX ORDER — SODIUM CHLORIDE, SODIUM LACTATE, POTASSIUM CHLORIDE, CALCIUM CHLORIDE 600; 310; 30; 20 MG/100ML; MG/100ML; MG/100ML; MG/100ML
INJECTION, SOLUTION INTRAVENOUS
Status: DISCONTINUED | OUTPATIENT
Start: 2024-12-20 | End: 2024-12-20 | Stop reason: SDUPTHER

## 2024-12-20 RX ORDER — DEXMEDETOMIDINE HYDROCHLORIDE 100 UG/ML
INJECTION, SOLUTION INTRAVENOUS
Status: COMPLETED
Start: 2024-12-20 | End: 2024-12-20

## 2024-12-20 RX ORDER — HYDROMORPHONE HYDROCHLORIDE 2 MG/1
2 TABLET ORAL EVERY 4 HOURS PRN
Status: CANCELLED | OUTPATIENT
Start: 2024-12-20

## 2024-12-20 RX ORDER — DEXMEDETOMIDINE HYDROCHLORIDE 100 UG/ML
INJECTION, SOLUTION INTRAVENOUS
Status: COMPLETED | OUTPATIENT
Start: 2024-12-20 | End: 2024-12-20

## 2024-12-20 RX ORDER — ROPIVACAINE HYDROCHLORIDE 5 MG/ML
INJECTION, SOLUTION EPIDURAL; INFILTRATION; PERINEURAL
Status: COMPLETED | OUTPATIENT
Start: 2024-12-20 | End: 2024-12-20

## 2024-12-20 RX ORDER — DEXAMETHASONE SODIUM PHOSPHATE 10 MG/ML
INJECTION, SOLUTION INTRAMUSCULAR; INTRAVENOUS
Status: COMPLETED | OUTPATIENT
Start: 2024-12-20 | End: 2024-12-20

## 2024-12-20 RX ORDER — SODIUM CHLORIDE 0.9 % (FLUSH) 0.9 %
5-40 SYRINGE (ML) INJECTION PRN
Status: CANCELLED | OUTPATIENT
Start: 2024-12-20

## 2024-12-20 RX ORDER — LIDOCAINE HYDROCHLORIDE 20 MG/ML
INJECTION, SOLUTION EPIDURAL; INFILTRATION; INTRACAUDAL; PERINEURAL
Status: DISCONTINUED | OUTPATIENT
Start: 2024-12-20 | End: 2024-12-20 | Stop reason: SDUPTHER

## 2024-12-20 RX ORDER — SODIUM CHLORIDE 0.9 % (FLUSH) 0.9 %
5-40 SYRINGE (ML) INJECTION EVERY 12 HOURS SCHEDULED
Status: CANCELLED | OUTPATIENT
Start: 2024-12-20

## 2024-12-20 RX ORDER — ONDANSETRON 2 MG/ML
4 INJECTION INTRAMUSCULAR; INTRAVENOUS EVERY 6 HOURS PRN
Status: CANCELLED | OUTPATIENT
Start: 2024-12-20

## 2024-12-20 RX ORDER — TRANEXAMIC ACID 650 MG/1
1950 TABLET ORAL ONCE
Status: COMPLETED | OUTPATIENT
Start: 2024-12-20 | End: 2024-12-20

## 2024-12-20 RX ORDER — SODIUM CHLORIDE 9 MG/ML
INJECTION, SOLUTION INTRAVENOUS CONTINUOUS
Status: DISCONTINUED | OUTPATIENT
Start: 2024-12-20 | End: 2024-12-20 | Stop reason: HOSPADM

## 2024-12-20 RX ORDER — ONDANSETRON 2 MG/ML
INJECTION INTRAMUSCULAR; INTRAVENOUS
Status: DISCONTINUED | OUTPATIENT
Start: 2024-12-20 | End: 2024-12-20 | Stop reason: SDUPTHER

## 2024-12-20 RX ORDER — FENTANYL CITRATE 50 UG/ML
25 INJECTION, SOLUTION INTRAMUSCULAR; INTRAVENOUS EVERY 5 MIN PRN
Status: DISCONTINUED | OUTPATIENT
Start: 2024-12-20 | End: 2024-12-20 | Stop reason: HOSPADM

## 2024-12-20 RX ORDER — DIPHENHYDRAMINE HYDROCHLORIDE 50 MG/ML
12.5 INJECTION INTRAMUSCULAR; INTRAVENOUS
Status: DISCONTINUED | OUTPATIENT
Start: 2024-12-20 | End: 2024-12-20 | Stop reason: HOSPADM

## 2024-12-20 RX ORDER — ACETAMINOPHEN 325 MG/1
650 TABLET ORAL
Status: DISCONTINUED | OUTPATIENT
Start: 2024-12-20 | End: 2024-12-20 | Stop reason: HOSPADM

## 2024-12-20 RX ORDER — VANCOMYCIN HYDROCHLORIDE 1 G/20ML
INJECTION, POWDER, LYOPHILIZED, FOR SOLUTION INTRAVENOUS PRN
Status: DISCONTINUED | OUTPATIENT
Start: 2024-12-20 | End: 2024-12-20 | Stop reason: ALTCHOICE

## 2024-12-20 RX ORDER — OXYCODONE HYDROCHLORIDE 5 MG/1
5 TABLET ORAL
Status: DISCONTINUED | OUTPATIENT
Start: 2024-12-20 | End: 2024-12-20 | Stop reason: HOSPADM

## 2024-12-20 RX ORDER — DEXAMETHASONE SODIUM PHOSPHATE 10 MG/ML
INJECTION, SOLUTION INTRAMUSCULAR; INTRAVENOUS
Status: COMPLETED
Start: 2024-12-20 | End: 2024-12-20

## 2024-12-20 RX ORDER — POVIDONE-IODINE 5 %
SOLUTION, NON-ORAL OPHTHALMIC (EYE) PRN
Status: DISCONTINUED | OUTPATIENT
Start: 2024-12-20 | End: 2024-12-20 | Stop reason: ALTCHOICE

## 2024-12-20 RX ADMIN — Medication 100 MCG: at 07:46

## 2024-12-20 RX ADMIN — Medication 100 MCG: at 08:17

## 2024-12-20 RX ADMIN — LIDOCAINE HYDROCHLORIDE 80 MG: 20 INJECTION, SOLUTION EPIDURAL; INFILTRATION; INTRACAUDAL; PERINEURAL at 07:40

## 2024-12-20 RX ADMIN — Medication 100 MCG: at 07:44

## 2024-12-20 RX ADMIN — SODIUM CHLORIDE, SODIUM LACTATE, POTASSIUM CHLORIDE, AND CALCIUM CHLORIDE: 600; 310; 30; 20 INJECTION, SOLUTION INTRAVENOUS at 08:49

## 2024-12-20 RX ADMIN — Medication 100 MCG: at 08:10

## 2024-12-20 RX ADMIN — EPHEDRINE SULFATE 10 MG: 5 INJECTION INTRAVENOUS at 07:49

## 2024-12-20 RX ADMIN — TRANEXAMIC ACID 1950 MG: 650 TABLET ORAL at 06:31

## 2024-12-20 RX ADMIN — ONDANSETRON 4 MG: 2 INJECTION INTRAMUSCULAR; INTRAVENOUS at 09:07

## 2024-12-20 RX ADMIN — ROPIVACAINE HYDROCHLORIDE 20 ML: 5 INJECTION, SOLUTION EPIDURAL; INFILTRATION; PERINEURAL at 06:57

## 2024-12-20 RX ADMIN — PROPOFOL 100 MG: 10 INJECTION, EMULSION INTRAVENOUS at 07:41

## 2024-12-20 RX ADMIN — VANCOMYCIN HYDROCHLORIDE 1000 MG: 1 INJECTION, POWDER, LYOPHILIZED, FOR SOLUTION INTRAVENOUS at 07:31

## 2024-12-20 RX ADMIN — EPHEDRINE SULFATE 10 MG: 5 INJECTION INTRAVENOUS at 09:19

## 2024-12-20 RX ADMIN — PROPOFOL 30 MG: 10 INJECTION, EMULSION INTRAVENOUS at 09:15

## 2024-12-20 RX ADMIN — SUGAMMADEX 140 MG: 100 INJECTION, SOLUTION INTRAVENOUS at 09:11

## 2024-12-20 RX ADMIN — Medication 50 MCG: at 07:51

## 2024-12-20 RX ADMIN — EPHEDRINE SULFATE 10 MG: 5 INJECTION INTRAVENOUS at 07:51

## 2024-12-20 RX ADMIN — MIDAZOLAM 2 MG: 1 INJECTION INTRAMUSCULAR; INTRAVENOUS at 06:57

## 2024-12-20 RX ADMIN — Medication 100 MCG: at 07:45

## 2024-12-20 RX ADMIN — EPHEDRINE SULFATE 10 MG: 5 INJECTION INTRAVENOUS at 07:47

## 2024-12-20 RX ADMIN — ACETAMINOPHEN 650 MG: 325 TABLET ORAL at 06:31

## 2024-12-20 RX ADMIN — PHENYLEPHRINE HYDROCHLORIDE 40 MCG/MIN: 10 INJECTION INTRAVENOUS at 08:28

## 2024-12-20 RX ADMIN — DEXMEDETOMIDINE HYDROCHLORIDE 0.3 ML: 100 INJECTION, SOLUTION INTRAVENOUS at 06:57

## 2024-12-20 RX ADMIN — DEXAMETHASONE SODIUM PHOSPHATE 4 MG: 10 INJECTION, SOLUTION INTRAMUSCULAR; INTRAVENOUS at 06:57

## 2024-12-20 RX ADMIN — SODIUM CHLORIDE: 900 INJECTION, SOLUTION INTRAVENOUS at 06:42

## 2024-12-20 RX ADMIN — ROCURONIUM BROMIDE 10 MG: 10 INJECTION, SOLUTION INTRAVENOUS at 08:48

## 2024-12-20 RX ADMIN — DEXAMETHASONE SODIUM PHOSPHATE 4 MG: 4 INJECTION INTRA-ARTICULAR; INTRALESIONAL; INTRAMUSCULAR; INTRAVENOUS; SOFT TISSUE at 08:03

## 2024-12-20 RX ADMIN — FENTANYL CITRATE 100 MCG: 50 INJECTION, SOLUTION INTRAMUSCULAR; INTRAVENOUS at 07:37

## 2024-12-20 RX ADMIN — PREGABALIN 50 MG: 50 CAPSULE ORAL at 06:31

## 2024-12-20 RX ADMIN — Medication 100 MCG: at 08:27

## 2024-12-20 RX ADMIN — ROCURONIUM BROMIDE 30 MG: 10 INJECTION, SOLUTION INTRAVENOUS at 07:41

## 2024-12-20 ASSESSMENT — PAIN SCALES - GENERAL
PAINLEVEL_OUTOF10: 0
PAINLEVEL_OUTOF10: 0

## 2024-12-20 NOTE — INTERVAL H&P NOTE
Update History & Physical    The patient's History and Physical of December 18, 2024 was reviewed with the patient and I examined the patient. There was no change. The surgical site was confirmed by the patient and me.     Plan: The risks, benefits, expected outcome, and alternative to the recommended procedure have been discussed with the patient. Patient understands and wants to proceed with the procedure.     Electronically signed by IHSAN ALEXANDRA MD on 12/20/2024 at 7:25 AM

## 2024-12-20 NOTE — PERIOP NOTE
Reviewed PTA medication list with patient/caregiver and patient/caregiver denies any additional medications.     Patient admits to having a responsible adult care for them at home for at least 24 hours after surgery.    Patient encouraged to use gown warming system and informed that using said warming gown to regulate body temperature prior to a procedure has been shown to help reduce the risks of blood clots and infection.    Patient's pharmacy of choice verified and documented in PTA medication section.    Dual skin assessment & fall risk band verification completed with Antionette GARZA RN.

## 2024-12-20 NOTE — ANESTHESIA POSTPROCEDURE EVALUATION
Post-Anesthesia Evaluation and Assessment    Cardiovascular Function/Vital Signs  Visit Vitals  BP (!) 101/57   Pulse 85   Temp 36.6 °C (97.8 °F) (Temporal)   Resp 14   Ht 1.499 m (4' 11\")   Wt 67.6 kg (149 lb)   SpO2 96%   BMI 30.09 kg/m²       Patient is status post Procedure(s):  REVERSE TOTAL LEFT SHOULDER ARTHROPLASTY **CONTACT ISOLATION** \"SPEC POP\".    Nausea/Vomiting: Controlled.    Postoperative hydration reviewed and adequate.    Pain:      Managed.    Neurological Status:       At baseline.    Mental Status and Level of Consciousness: Arousable.    Pulmonary Status:       Adequate oxygenation and airway patent.    Complications related to anesthesia: None    Post-anesthesia assessment completed. No concerns.    Patient has met all discharge requirements.    Signed By: RONNA George - LORI    December 20, 2024

## 2024-12-20 NOTE — ANESTHESIA PROCEDURE NOTES
Peripheral Block    Patient location during procedure: holding area  Reason for block: procedure for pain, post-op pain management and at surgeon's request  Start time: 12/20/2024 6:57 AM  End time: 12/20/2024 7:02 AM  Staffing  Performed: anesthesiologist   Anesthesiologist: Jorge Beavers MD  Performed by: Jorge Beavers MD  Authorized by: Jorge Beavers MD    Preanesthetic Checklist  Completed: patient identified, IV checked, site marked, risks and benefits discussed, surgical/procedural consents, equipment checked, pre-op evaluation, timeout performed, anesthesia consent given, oxygen available, monitors applied/VS acknowledged, fire risk safety assessment completed and verbalized and blood product R/B/A discussed and consented  Peripheral Block   Patient position: supine  Prep: ChloraPrep  Provider prep: sterile gloves  Patient monitoring: cardiac monitor, continuous pulse ox, continuous capnometry, frequent blood pressure checks, IV access, oxygen and responsive to questions  Block type: Brachial plexus  Interscalene  Laterality: left  Injection technique: single-shot  Guidance: nerve stimulator and ultrasound guided    Needle   Needle type: insulated echogenic nerve stimulator needle   Needle gauge: 21 G  Needle localization: ultrasound guidance and nerve stimulator  Needle insertion depth: 2 cm  Needle length: 5 cm  Assessment   Injection assessment: negative aspiration for heme, no paresthesia on injection, local visualized surrounding nerve on ultrasound and no intravascular symptoms  Paresthesia pain: none  Slow fractionated injection: yes  Hemodynamics: stable  Outcomes: uncomplicated and patient tolerated procedure well    Medications Administered  ropivacaine (NAROPIN) injection 0.5% - Perineural   20 mL - 12/20/2024 6:57:00 AM  dexAMETHasone (DECADRON) (PF) 10 mg/mL injection - Other   4 mg - 12/20/2024 6:57:00 AM  dexmedeTOMIDine (PRECEDEX) injection 200 mcg/2 mL - Perineural   0.3 mL - 12/20/2024 6:57:00

## 2024-12-20 NOTE — PERIOP NOTE
No intake/output data recorded.  I/O this shift:  In: 3400 [P.O.:600; I.V.:2800]  Out: 150 [Blood:150]

## 2024-12-20 NOTE — DISCHARGE INSTRUCTIONS
Follow preprinted discharge instructions sheet from Dr. Frey's office  Contact Dr. Frey's office with any Post op questions or concerns at [phone]    Ice and elevation  Take prescription as directed  Ambulate multiple times daily  Regular diet  Drink plenty of fluids  Follow Dr. Frey's instructions for SLING use    DISCHARGE SUMMARY from Nurse    PATIENT INSTRUCTIONS:    After general anesthesia or intravenous sedation, for 24 hours or while taking prescription Narcotics:  Limit your activities  Do not drive and operate hazardous machinery  Do not make important personal or business decisions  Do  not drink alcoholic beverages  If you have not urinated within 8 hours after discharge, please contact your surgeon on call.    Report the following to your surgeon:  Excessive pain, swelling, redness or odor of or around the surgical area  Temperature over 100.5  Nausea and vomiting lasting longer than 4 hours or if unable to take medications  Any signs of decreased circulation or nerve impairment to extremity: change in color, persistent  numbness, tingling, coldness or increase pain  Any questions    What to do at Home:  Recommended activity: no lifting, Driving, or Strenuous exercise until advised.    If you experience any of the following symptoms above, please follow up with Dr Frey.    *  Please give a list of your current medications to your Primary Care Provider.    *  Please update this list whenever your medications are discontinued, doses are      changed, or new medications (including over-the-counter products) are added.    *  Please carry medication information at all times in case of emergency situations.    These are general instructions for a healthy lifestyle:    No smoking/ No tobacco products/ Avoid exposure to second hand smoke  Surgeon General's Warning:  Quitting smoking now greatly reduces serious risk to your health.    Obesity, smoking, and sedentary lifestyle greatly increases

## 2024-12-20 NOTE — PERIOP NOTE
TRANSFER - OUT REPORT:    Verbal report given to JOHNNY Wade on Domenica HOYOS Walker  being transferred to phase 2 for routine post-op       Report consisted of patient's Situation, Background, Assessment and   Recommendations(SBAR).     Information from the following report(s) Nurse Handoff Report, Adult Overview, Surgery Report, Intake/Output, MAR, Recent Results, and Med Rec Status was reviewed with the receiving nurse.           Lines:   Peripheral IV 12/20/24 Posterior;Right Hand (Active)   Site Assessment Clean, dry & intact 12/20/24 0940   Line Status Infusing 12/20/24 0940   Phlebitis Assessment No symptoms 12/20/24 0940   Infiltration Assessment 0 12/20/24 0940   Alcohol Cap Used No 12/20/24 0642   Dressing Status Clean, dry & intact 12/20/24 0940   Dressing Type Transparent 12/20/24 0940   Dressing Intervention New 12/20/24 0642        Opportunity for questions and clarification was provided.      Patient transported with:  Registered Nurse

## 2024-12-20 NOTE — PERIOP NOTE
TRANSFER - IN REPORT:    Verbal report received from JOHNNY Mullins  on Domenica A Walker  being received from PACU  for routine progression of patient care      Report consisted of patient's Situation, Background, Assessment and   Recommendations(SBAR).     Information from the following report(s) Nurse Handoff Report, Surgery Report, Intake/Output, and MAR was reviewed with the receiving nurse.    Opportunity for questions and clarification was provided.      Assessment completed upon patient's arrival to unit and care assumed.

## 2024-12-20 NOTE — OP NOTE
PREOPERATIVE DIAGNOSIS: Cuff Tear Arthropathy, left shoulder.    POSTOPERATIVE DIAGNOSIS: Cuff Tear Arthropathy, left shoulder.    PROCEDURE: left Reverse Total Shoulder Arthroplasty Tornier    IMPLANTS:   Implant Name Type Inv. Item Serial No.  Lot No. LRB No. Used Action   BASEPLATE ALEX OD29MM STD TI POR SHLDR LAT AEQUALIS PERFORM - [id number]  BASEPLATE ALEX OD29MM STD TI POR SHLDR LAT AEQUALIS PERFORM [id number] [employer] [facility]  Left 1 Implanted   SCREW BONE L30MM DIA6.5MM TI ST FULL THRD CTRL FOR ALEX - [id number]  SCREW BONE L30MM DIA6.5MM TI ST FULL THRD CTRL FOR ALEX  [facility] [id number] Left 1 Implanted   SCREW BONE L30MM DIA5MM TI ST FULL THRD PERIPH FOR ALEX - [id number]  SCREW BONE L30MM DIA5MM TI ST FULL THRD PERIPH FOR ALEX  [facility] [id number] Left 1 Implanted   SCREW BNE PERIPH 5X38 MM SHLDR REVERSED NS AEQUALIS PERFORM - [id number]  SCREW BNE PERIPH 5X38 MM SHLDR REVERSED NS AEQUALIS PERFORM  Mingly [id number] Left 1 Implanted   SPHERE ALEX KPF49RN STD REVERSED AEQUALIS PERFORMA - [id number]  SPHERE ALEX OOR29NC STD REVERSED AEQUALIS PERFORMA [id number] [employer] [facility]  Left 1 Implanted   STEM HUM SZ 2B L93MM 1325DEG LNG PTC FOR CONV SHLDR SYS - [id number]  STEM HUM SZ 2B L93MM 1325DEG LNG PTC FOR CONV SHLDR SYS [id number] [facility]  Left 1 Implanted   TRAY HUM THK+0MM 3.5MM OFFSET SHLDR HI REVERSED AEQUALIS - [id number]  TRAY HUM THK+0MM 3.5MM OFFSET SHLDR HI REVERSED AEQUALIS [id number] [facility]  Left 1 Implanted   INSERT SHLDR C XOU43HH THK+6MM 7.5DEG REVERSED AEQUALIS - [id number]  INSERT SHLDR C OFQ01VI THK+6MM 7.5DEG REVERSED AEQUALIS [id number] [facility]  Left 1 Implanted       SURGEON: Mo Frey MD    ASSISTANTS: Circulator: Josemanuel Mclaughlin RN  Surgical Assistant: Annette Mancini Circulator: Aric  JOHNNY Luis  Relief Scrub: Tonie Perla  Scrub Person First: Aj Carmenza; Danish Salazar    ANAESTHESIA: general anesthesia after scalene block.    COMPLICATIONS: None.     ESTIMATED BLOOD LOSS: 150 cc    SPECIMENS: None     DRAINS:  None.    DESCRIPTION OF PROCEDURE: This 79 y.o.year-old female  with a history of persistent left shoulder pain and weakness, had a history of an unrepairable rotator cuff tear and severe DJD of the glenohumeral joint. The patient then opted to proceed with reconstructive surgery.     The patient was taken to the operating room and placed in the beach-chair position, and the left shoulder was prepped and draped in a normal manner. The skin was injected with 0.5% Marcaine with epinephrine. An oblique incision was made over the anterior aspect of the shoulder. The incision was carried through the subcutaneous tissue through the deep fascia and the cephalic vein was retracted laterally. The dissection was carried around the humeral head. The biceps was dissected free, tenodesed distally with #2 FiberWire suture and the intraarticular portion was excised. The subscapularis was freed circumferentially and allowed to retract medially. The humeral head was dislocated, and it was noted to have severe arthritic changes, along with a massive unrepairable tear of the rotator cuff.The head was osteotomized using a saw guide. The shaft was prepared using sequential reamers. The humeral head was retracted inferiorly and posteriorly.     Attention was then turned to the glenoid, which showed severe arthritic changes. The labrum was excised circumferentially. The glenoid reamer was then used and finally the metaglene was placed in position, it was then secured using 3 screws, 2 of which were locking. The  glenosphere was then screwed in position.    The proximal humerus was then prepared and trials were placed in position with sequential reductions. The trials were removed. The shaft was

## 2024-12-20 NOTE — ANESTHESIA PRE PROCEDURE
Department of Anesthesiology  Preprocedure Note       Name:  Domenica Mejia   Age:  79 y.o.  :  1945                                          MRN:  [id number]         Date:  2024      Surgeon: Surgeon(s):  Mo Frey MD    Procedure: Procedure(s):  REVERSE TOTAL LEFT SHOULDER ARTHROPLASTY **CONTACT ISOLATION** \"SPEC POP\"    Medications prior to admission:   Prior to Admission medications    Medication Sig Start Date End Date Taking? Authorizing Provider   ferrous sulfate (IRON 325) 325 (65 Fe) MG tablet Take 1 tablet by mouth every other day   Yes Riley Wolff MD   Multiple Vitamins-Minerals (MULTIVITAMIN WOMEN 50+ PO) Take 1 tablet by mouth daily   Yes Riley Wolff MD   lisinopril (PRINIVIL;ZESTRIL) 5 MG tablet Take 1 tablet by mouth daily Indications: HTN   Yes Automatic Reconciliation, Ar   acetaminophen (TYLENOL) 650 MG extended release tablet Take 1 tablet by mouth as needed   Yes Riley Wolff MD   loperamide (IMODIUM) 2 MG capsule Take 1 capsule by mouth daily   Yes Riley Wolff MD   montelukast (SINGULAIR) 10 MG tablet Take 1 tablet by mouth nightly Indications: lung issue 23  Yes Riley Wolff MD   omeprazole (PRILOSEC) 20 MG delayed release capsule Take 2 capsules by mouth Daily Indications: GERD 23  Yes Riley Wolff MD   b complex vitamins capsule Take 1 capsule by mouth daily   Yes Riley Wolff MD   magnesium 30 MG tablet Take 250 mg by mouth daily   Yes Riley Wolff MD   turmeric 500 MG CAPS Take 1 capsule by mouth daily   Yes Riley Wolff MD   sertraline (ZOLOFT) 25 MG tablet Take 2 tablets by mouth nightly Indications: depression  Patient not taking: Reported on 2024    Riley Wolff MD   fluticasone-umeclidin-vilant (TRELEGY ELLIPTA) 200-62.5-25 MCG/ACT AEPB inhaler Inhale 1 puff into the lungs daily Indications: Lung issues    Riley Wolff MD       Current medications:

## 2024-12-20 NOTE — PERIOP NOTE
TRANSFER - IN REPORT:    Verbal report received from OR nurse and CRNA on Domenica Mejia  being received from OR for routine post-op      Report consisted of patient's Situation, Background, Assessment and   Recommendations(SBAR).     Information from the following report(s) Nurse Handoff Report, Adult Overview, Surgery Report, Intake/Output, MAR, Recent Results, and Med Rec Status was reviewed with the receiving nurse.    Opportunity for questions and clarification was provided.      Assessment completed upon patient's arrival to unit and care assumed.

## 2024-12-20 NOTE — PERIOP NOTE
Patient was only able to complete 2 days of Bactroban ointment so unable to finish MRSA bundle. Will be put on contact precautions and vancomycin ordered for patient.

## (undated) DEVICE — SOLUTION LACTATED RINGERS INJECTION USP

## (undated) DEVICE — SUTURE STRATAFIX SPRL SZ 4-0 L12IN ABSRB UD FS-2 L19MM 3/8 SXMP2B409

## (undated) DEVICE — HANDPIECE SET WITH HIGH FLOW TIP AND SUCTION TUBE: Brand: INTERPULSE

## (undated) DEVICE — BLADE SAW 1.27X90 MM FOR LG BNE [KMS2513PM62STE] [KOMET MEDICAL]

## (undated) DEVICE — COAXIAL FEMORAL CANAL TIP

## (undated) DEVICE — Device

## (undated) DEVICE — DRAIN SURG 10FR L1/8IN RND CHN FULL FLUT HEAT POLISHED PERF

## (undated) DEVICE — BIT DRL KIT SHLDR 2MM/3.2MM -- DISP EQUINOXE

## (undated) DEVICE — CATH IV AUTOGRD ORN 14GA 45MM -- INSYTE-N

## (undated) DEVICE — GLOVE SURG SZ 65 L12IN FNGR THK79MIL GRN LTX FREE

## (undated) DEVICE — GARMENT,MEDLINE,DVT,INT,CALF,MED, GEN2: Brand: MEDLINE

## (undated) DEVICE — SOL IRR STRL H2O 1500ML BTL --

## (undated) DEVICE — SUTURE FIBERWIRE SZ 2 W/ TAPERED NEEDLE BLUE L38IN NONABSORB BLU L26.5MM 1/2 CIRCLE AR7200

## (undated) DEVICE — APPLICATOR MEDICATED 26 CC SOLUTION HI LT ORNG CHLORAPREP

## (undated) DEVICE — REM POLYHESIVE ADULT PATIENT RETURN ELECTRODE: Brand: VALLEYLAB

## (undated) DEVICE — GOWN,SIRUS,NONRNF,XLN/2XL,18/CS: Brand: MEDLINE

## (undated) DEVICE — SUTURE FIBERWIRE SZ 2 L38IN 97CM NONABSB BLU L26.5MM W/TWO TAPERED NEEDLES  1/2 CIRCLE AR7205

## (undated) DEVICE — HEAD DONUT FOAM POSITIONER: Brand: CARDINAL HEALTH

## (undated) DEVICE — SPONGE GZ W4XL4IN COT 12 PLY TYP VII WVN C FLD DSGN

## (undated) DEVICE — SUT VCRL + 2-0 36IN CT1 UD --

## (undated) DEVICE — STERILE POLYISOPRENE POWDER-FREE SURGICAL GLOVES: Brand: PROTEXIS

## (undated) DEVICE — GLOVE SURG SZ 7 L12IN FNGR THK79MIL GRN LTX FREE

## (undated) DEVICE — DRESSING FOAM 4X8IN DISP POSTOP MEPILEX BORD AG

## (undated) DEVICE — GLOVE SURG SZ 65 THK91MIL LTX FREE SYN POLYISOPRENE

## (undated) DEVICE — SOL IRRIGATION INJ NACL 0.9% 500ML BTL

## (undated) DEVICE — SYRINGE MED 5ML STD CLR PLAS LUERLOCK TIP N CTRL DISP

## (undated) DEVICE — 2.5MM GUIDE WIRE

## (undated) DEVICE — SOLUTION IV 500ML 0.9% SOD CHL FLX CONT

## (undated) DEVICE — SUTURE VCRL SZ 2-0 L18IN ABSRB VLT L26MM CT-2 1/2 CIR J726D

## (undated) DEVICE — HYPODERMIC SAFETY NEEDLE: Brand: MAGELLAN

## (undated) DEVICE — GLOVE SURG SZ 9 THK91MIL LTX FREE SYN POLYISOPRENE ANTI

## (undated) DEVICE — SUTURE STRATAFIX SZ 3-0 L30CM NONABSORBABLE UD L19MM PS-2 SXMP2B408

## (undated) DEVICE — SUTURE VICRYL + SZ 0 L27IN ABSRB UD OS6 L36MM 1/2 CIR REV

## (undated) DEVICE — DERMABOND SKIN ADH 0.7ML --

## (undated) DEVICE — PACK PROCEDURE SURG TOT SHLDR CUST

## (undated) DEVICE — POWDER HEMOSTAT GEL 1GR --

## (undated) DEVICE — STRYKER PERFORMANCE SERIES SAGITTAL BLADE: Brand: STRYKER PERFORMANCE SERIES

## (undated) DEVICE — SOLUTION IRRIG 1500ML STRL H2O AQUALITE PLAS POUR BTL

## (undated) DEVICE — PREP SKN CHLRAPRP APL 26ML STR --

## (undated) DEVICE — SYR LR LCK 1ML GRAD NSAF 30ML --

## (undated) DEVICE — PACK PROCEDURE SURG ANTR CERV DISCECTOMY

## (undated) DEVICE — GLOVE SURG SZ 65 CRM LTX FREE POLYISOPRENE POLYMER BEAD ANTI

## (undated) DEVICE — OPTIFOAM GENTLE SA, POSTOP, 4X8: Brand: MEDLINE

## (undated) DEVICE — 3M™ STERI-DRAPE™ U-DRAPE 1015: Brand: STERI-DRAPE™

## (undated) DEVICE — BONE SCREW, LOCKING, 3.5MM X 16MM
Type: IMPLANTABLE DEVICE | Site: SPINE CERVICAL | Status: NON-FUNCTIONAL
Brand: ENDOSKELETON® TCS
Removed: 2022-04-21

## (undated) DEVICE — SUTURE VCRL 0 L27IN ABSRB OS-6 BRAID COAT UD J534H

## (undated) DEVICE — SOLUTION IV LACTATED RINGERS INJECTION USP

## (undated) DEVICE — ELECTRODE PT RET AD L9FT HI MOIST COND ADH HYDRGEL CORDED

## (undated) DEVICE — GOWN,NON-REINFORCED,XXL: Brand: MEDLINE

## (undated) DEVICE — SUTURE VICRYL + SZ 2-0 L36IN ABSRB UD L36MM CT-1 1/2 CIR VCP945H

## (undated) DEVICE — SPONGE,NEURO,0.5"X0.5",XR,STRL,10/PK: Brand: MEDLINE

## (undated) DEVICE — NEEDLE HYPO 21GA L1.5IN INTRAMUSCULAR S STL LATCH BVL UP

## (undated) DEVICE — SYRINGE IRRIG 60ML SFT PLIABLE BLB EZ TO GRP 1 HND USE W/

## (undated) DEVICE — RESERVOIR,SUCTION,100CC,SILICONE: Brand: MEDLINE

## (undated) DEVICE — SINGLE PORT MANIFOLD: Brand: NEPTUNE 2

## (undated) DEVICE — SPONGE LAP 18X18IN STRL -- 5/PK

## (undated) DEVICE — SUTURE STRATAFIX SZ 3-0 L30CM NONABSORBABLE UD L19MM FS-2 SXMP2B408